# Patient Record
Sex: MALE | Race: WHITE | NOT HISPANIC OR LATINO | Employment: OTHER | URBAN - METROPOLITAN AREA
[De-identification: names, ages, dates, MRNs, and addresses within clinical notes are randomized per-mention and may not be internally consistent; named-entity substitution may affect disease eponyms.]

---

## 2017-02-14 ENCOUNTER — CLINICAL SUPPORT (OUTPATIENT)
Dept: ORTHOPEDIC SURGERY | Facility: CLINIC | Age: 77
End: 2017-02-14

## 2017-02-14 VITALS — BODY MASS INDEX: 29.82 KG/M2 | TEMPERATURE: 97.6 F | HEIGHT: 67 IN | WEIGHT: 190 LBS

## 2017-02-14 DIAGNOSIS — M17.11 ARTHRITIS OF RIGHT KNEE: Primary | ICD-10-CM

## 2017-02-14 PROCEDURE — 99212 OFFICE O/P EST SF 10 MIN: CPT | Performed by: ORTHOPAEDIC SURGERY

## 2017-02-14 RX ORDER — BACLOFEN 10 MG/1
10 TABLET ORAL 3 TIMES DAILY
Qty: 90 TABLET | Refills: 1 | Status: SHIPPED | OUTPATIENT
Start: 2017-02-14 | End: 2017-03-02

## 2017-02-14 NOTE — PATIENT INSTRUCTIONS
Generic Knee Exercises  EXERCISES  RANGE OF MOTION (ROM) AND STRETCHING EXERCISES  These exercises may help you when beginning to rehabilitate your injury. Your symptoms may resolve with or without further involvement from your physician, physical therapist, or . While completing these exercises, remember:   · Restoring tissue flexibility helps normal motion to return to the joints. This allows healthier, less painful movement and activity.  · An effective stretch should be held for at least 30 seconds.  · A stretch should never be painful. You should only feel a gentle lengthening or release in the stretched tissue.  STRETCH - Knee Extension, Prone  · Lie on your stomach on a firm surface, such as a bed or countertop. Place your right / left knee and leg just beyond the edge of the surface. You may wish to place a towel under the far end of your right / left thigh for comfort.  · Relax your leg muscles and allow gravity to straighten your knee. Your clinician may advise you to add an ankle weight if more resistance is helpful for you.  · You should feel a stretch in the back of your right / left knee. Hold this position for __________ seconds.  Repeat __________ times. Complete this stretch __________ times per day.  * Your physician, physical therapist, or  may ask you to add ankle weight to enhance your stretch.   RANGE OF MOTION - Knee Flexion, Active  · Lie on your back with both knees straight. (If this causes back discomfort, bend your opposite knee, placing your foot flat on the floor.)  · Slowly slide your heel back toward your buttocks until you feel a gentle stretch in the front of your knee or thigh.  · Hold for __________ seconds. Slowly slide your heel back to the starting position.  Repeat __________ times. Complete this exercise __________ times per day.   STRETCH - Quadriceps, Prone   · Lie on your stomach on a firm surface, such as a bed or padded floor.  · Bend your  right / left knee and grasp your ankle. If you are unable to reach your ankle or pant leg, use a belt around your foot to lengthen your reach.  · Gently pull your heel toward your buttocks. Your knee should not slide out to the side. You should feel a stretch in the front of your thigh and/or knee.  · Hold this position for __________ seconds.  Repeat __________ times. Complete this stretch __________ times per day.   STRETCH - Hamstrings, Supine   · Lie on your back. Loop a belt or towel over the ball of your right / left foot.  · Straighten your right / left knee and slowly pull on the belt to raise your leg. Do not allow the right / left knee to bend. Keep your opposite leg flat on the floor.  · Raise the leg until you feel a gentle stretch behind your right / left knee or thigh. Hold this position for __________ seconds.  Repeat __________ times. Complete this stretch __________ times per day.   STRENGTHENING EXERCISES  These exercises may help you when beginning to rehabilitate your injury. They may resolve your symptoms with or without further involvement from your physician, physical therapist, or . While completing these exercises, remember:   · Muscles can gain both the endurance and the strength needed for everyday activities through controlled exercises.  · Complete these exercises as instructed by your physician, physical therapist, or . Progress the resistance and repetitions only as guided.  · You may experience muscle soreness or fatigue, but the pain or discomfort you are trying to eliminate should never worsen during these exercises. If this pain does worsen, stop and make certain you are following the directions exactly. If the pain is still present after adjustments, discontinue the exercise until you can discuss the trouble with your clinician.  STRENGTH - Quadriceps, Isometrics  · Lie on your back with your right / left leg extended and your opposite knee  "bent.  · Gradually tense the muscles in the front of your right / left thigh. You should see either your knee cap slide up toward your hip or increased dimpling just above the knee. This motion will push the back of the knee down toward the floor/mat/bed on which you are lying.  · Hold the muscle as tight as you can without increasing your pain for __________ seconds.  · Relax the muscles slowly and completely in between each repetition.  Repeat __________ times. Complete this exercise __________ times per day.   STRENGTH - Quadriceps, Short Arcs   · Lie on your back. Place a __________ inch towel roll under your knee so that the knee slightly bends.  · Raise only your lower leg by tightening the muscles in the front of your thigh. Do not allow your thigh to rise.  · Hold this position for __________ seconds.  Repeat __________ times. Complete this exercise __________ times per day.   OPTIONAL ANKLE WEIGHTS: Begin with ____________________, but DO NOT exceed ____________________. Increase in 1 pound/0.5 kilogram increments.   STRENGTH - Quadriceps, Straight Leg Raises   Quality counts! Watch for signs that the quadriceps muscle is working to insure you are strengthening the correct muscles and not \"cheating\" by substituting with healthier muscles.  · Lay on your back with your right / left leg extended and your opposite knee bent.  · Tense the muscles in the front of your right / left thigh. You should see either your knee cap slide up or increased dimpling just above the knee. Your thigh may even quiver.  · Tighten these muscles even more and raise your leg 4 to 6 inches off the floor. Hold for __________ seconds.  · Keeping these muscles tense, lower your leg.  · Relax the muscles slowly and completely in between each repetition.  Repeat __________ times. Complete this exercise __________ times per day.   STRENGTH - Hamstring, Curls  · Lay on your stomach with your legs extended. (If you lay on a bed, your feet " may hang over the edge.)  · Tighten the muscles in the back of your thigh to bend your right / left knee up to 90 degrees. Keep your hips flat on the bed/floor.  · Hold this position for __________ seconds.  · Slowly lower your leg back to the starting position.  Repeat __________ times. Complete this exercise __________ times per day.   OPTIONAL ANKLE WEIGHTS: Begin with ____________________, but DO NOT exceed ____________________. Increase in 1 pound/0.5 kilogram increments.   STRENGTH - Quadriceps, Squats  ·  a door frame so that your feet and knees are in line with the frame.  · Use your hands for balance, not support, on the frame.  · Slowly lower your weight, bending at the hips and knees. Keep your lower legs upright so that they are parallel with the door frame. Squat only within the range that does not increase your knee pain. Never let your hips drop below your knees.  · Slowly return upright, pushing with your legs, not pulling with your hands.  Repeat __________ times. Complete this exercise __________ times per day.   STRENGTH - Quadriceps, Wall Slides   Follow guidelines for form closely. Increased knee pain often results from poorly placed feet or knees.  · Lean against a smooth wall or door and walk your feet out 18-24 inches. Place your feet hip-width apart.  · Slowly slide down the wall or door until your knees bend __________ degrees.* Keep your knees over your heels, not your toes, and in line with your hips, not falling to either side.  · Hold for __________ seconds. Stand up to rest for __________ seconds in between each repetition.  Repeat __________ times. Complete this exercise __________ times per day.  * Your physician, physical therapist, or  will alter this angle based on your symptoms and progress.     This information is not intended to replace advice given to you by your health care provider. Make sure you discuss any questions you have with your health care  provider.     Document Released: 11/01/2006 Document Revised: 01/08/2016 Document Reviewed: 04/01/2010  [employer] Interactive Patient Education ©2016 [employer]

## 2017-03-02 ENCOUNTER — APPOINTMENT (OUTPATIENT)
Dept: PREADMISSION TESTING | Facility: HOSPITAL | Age: 77
End: 2017-03-02

## 2017-03-02 VITALS
WEIGHT: 185.3 LBS | RESPIRATION RATE: 16 BRPM | TEMPERATURE: 96.5 F | HEIGHT: 69 IN | HEART RATE: 77 BPM | SYSTOLIC BLOOD PRESSURE: 179 MMHG | DIASTOLIC BLOOD PRESSURE: 98 MMHG | OXYGEN SATURATION: 96 % | BODY MASS INDEX: 27.44 KG/M2

## 2017-03-02 DIAGNOSIS — G89.29 CHRONIC PAIN OF RIGHT KNEE: Primary | ICD-10-CM

## 2017-03-02 DIAGNOSIS — M25.561 CHRONIC PAIN OF RIGHT KNEE: Primary | ICD-10-CM

## 2017-03-02 LAB
ABO GROUP BLD: NORMAL
BILIRUB UR QL STRIP: NEGATIVE
BLD GP AB SCN SERPL QL: NEGATIVE
CLARITY UR: CLEAR
COLOR UR: ABNORMAL
GLUCOSE UR STRIP-MCNC: NEGATIVE MG/DL
HGB UR QL STRIP.AUTO: NEGATIVE
KETONES UR QL STRIP: NEGATIVE
LEUKOCYTE ESTERASE UR QL STRIP.AUTO: NEGATIVE
NITRITE UR QL STRIP: NEGATIVE
PH UR STRIP.AUTO: <=5 [PH] (ref 5–8)
PROT UR QL STRIP: NEGATIVE
RH BLD: POSITIVE
SP GR UR STRIP: 1.03 (ref 1–1.03)
UROBILINOGEN UR QL STRIP: ABNORMAL

## 2017-03-02 PROCEDURE — G8980 MOBILITY D/C STATUS: HCPCS

## 2017-03-02 PROCEDURE — G8979 MOBILITY GOAL STATUS: HCPCS

## 2017-03-02 PROCEDURE — 93005 ELECTROCARDIOGRAM TRACING: CPT

## 2017-03-02 PROCEDURE — 97161 PT EVAL LOW COMPLEX 20 MIN: CPT

## 2017-03-02 PROCEDURE — G8978 MOBILITY CURRENT STATUS: HCPCS

## 2017-03-02 PROCEDURE — 81003 URINALYSIS AUTO W/O SCOPE: CPT | Performed by: ORTHOPAEDIC SURGERY

## 2017-03-02 PROCEDURE — 86850 RBC ANTIBODY SCREEN: CPT

## 2017-03-02 PROCEDURE — 86901 BLOOD TYPING SEROLOGIC RH(D): CPT

## 2017-03-02 PROCEDURE — 36415 COLL VENOUS BLD VENIPUNCTURE: CPT

## 2017-03-02 PROCEDURE — 86900 BLOOD TYPING SEROLOGIC ABO: CPT

## 2017-03-02 NOTE — PROGRESS NOTES
Physical Therapy Outpatient Preoperative Total Joint Evaluation     Patient Name: Sundeep Mason  : 1940  MRN: [id number]  Today's Date: 3/2/2017         Surgery Date: 03/15/17    Patient Active Problem List   Diagnosis   • S/P total knee replacement using cement   • Arthritis of right knee   • Chronic pain of right knee        Past Medical History   Diagnosis Date   • Anxiety and depression      RECENT LOSS OF HIS WIFE; PT STATED THAT HE FELT LIKE DYING EVERYDAY BUT ISN'T SUICIDAL; ENCOURAGED HIM TO TALK TO PCP ABOUT GETTING HELP.   • Arthritis    • Asthma    • Chronic chest pain    • DDD (degenerative disc disease), cervical    • GERD (gastroesophageal reflux disease)    • Headache    • Hiatal hernia    • History of kidney stones    • Hx of staphylococcal infection      AFTER LEFT TKA   • Hyperlipidemia    • Hypertension    • Lung nodules    • Neck and shoulder pain    • Numbness or tingling    • Open wound of arm      RIGHT ARM; HEALING   • PVD (peripheral vascular disease)         Past Surgical History   Procedure Laterality Date   • Rotator cuff repair Bilateral    • Cervical discectomy     • Total knee arthroplasty Left    • Tonsillectomy                TOTAL JOINT PREOP EVAL (last 72 hours)      Total Joint Preop Evaluation       [id number]                Preoperative Evaluation    Surgery TKR: Right  -TV        Surgery Date 03/15/17  -TV        Previous Total Joint Yes  -TV        What type: l tkr 11 years ago  -TV        Attended Class yes  -TV        Prior Activity Status    All Household independent  -TV        All Community independent  -TV        Gait independent  -TV        Transfers independent  -TV         none  -TV        DC Plans Home;Sub Acute   unsure  -TV        Assist at home none  -TV        Home Environment 1 story;basement;lives first floor  -TV        Exterior steps 1 rail   3  -TV        Interior steps 1 rail   14  -TV        Pain 0-10    Pain Level 7  -TV        Location  r knee  -TV        LE Measurements - ROM    Hip Flexion - Right AROM is WNL;Strength is grossly > or equal to 3/5  -TV        Hip Abduction - Right AROM is WNL;Strength is grossly > or equal to 3/5  -TV        Knee Flexion - Right Strength is grossly > or equal to 3/5   110  -TV        Knee Extension - Right Strength is grossly > or equal to 3/5   -5  -TV        Hip Flexion - Left AROM is WNL;Strength is grossly > or equal to 3/5  -TV        Hip Abduction - Left AROM is WNL;Strength is grossly > or equal to 3/5  -TV        Knee Flexion - Left Strength is grossly > or equal to 3/5   90  -TV        Knee Extension - Left Strength is grossly > or equal to 3/5   0  -TV        UE ROM/Strength    UE ROM/Strength adequate for walker use  -TV        Other Measurements    Sensation/Circulation intact  -TV        Gait Observation no device  -TV        Equipment    Equipment Patient Has Walker;Cane;Bedside commode  -TV        ASSESSMENT    Goal Patient demonstrates good understanding of post-op P.T.;Exercises;Surgical Procedure  -TV        Goal Met? Yes  -TV        Patient agrees with Plan of Treatment? Yes  -TV        Plan Will see for post op TJR protocol  -TV          User Key  (r) = Recorded By, (t) = Taken By, (c) = Cosigned By    Initials Name Effective Dates    TV Lu Garcia PT 01/07/16 -              Time Calculation:          Therapy Charges for Today     Code Description Service Date Service Provider Modifiers Qty    10217432035 HC PT MOBILITY CURRENT 3/2/2017 Lu Garcia, PT GP, CI 1    27353466205 HC PT MOBILITY PROJECTED 3/2/2017 Lu Garcia, PT GP, CI 1    08760073257 HC PT MOBILITY DISCHARGE 3/2/2017 Lu Garcia, PT GP, CI 1    94643771795 HC PAT-TOTAL JOINT CLASS 3/2/2017 Lu Garcia PT  1    [id number] HC PT EVAL LOW COMPLEXITY 1 3/2/2017 Lu Garcia, PT GP 1            PT G-Codes  PT Professional Judgement Used?: Yes  Functional Limitation: Mobility: Walking and moving around  Mobility: Walking and Moving  Around Current Status (): At least 1 percent but less than 20 percent impaired, limited or restricted  Mobility: Walking and Moving Around Goal Status (): At least 1 percent but less than 20 percent impaired, limited or restricted  Mobility: Walking and Moving Around Discharge Status (): At least 1 percent but less than 20 percent impaired, limited or restricted      Lu Garcia, PT  3/2/2017

## 2017-03-02 NOTE — DISCHARGE INSTRUCTIONS
2% CHLORAHEXIDINE GLUCONATE* CLOTH  Preparing or “prepping” skin before surgery can reduce the risk of infection at the surgical site. To make the process easier, [facility] has chosen disposable cloths moistened with a rinse-free, 2% Chlorhexidine Gluconate (CHG) antiseptic solution. The steps below outline the prepping process and should be carefully followed.        Use the prep cloth on the area that is circled in the diagram             Directions Night before Surgery  1) Shower using a fresh bar of anti-bacterial soap (such as Dial) and clean washcloth.  Use a clean towel to completely dry your skin.  2) Do not use any lotions, oils or creams on your skin.  3) Open the package and remove 1 cloth, wipe your skin for 30 seconds in a circular motion.  Allow to dry for 3 minutes.  4) Repeat #3 with second cloth.  5) Do not touch your eyes, ears, or mouth with the prep cloth.  6) Allow the wet prep solution to air dry.  7) Discard the prep cloth and wash your hands with soap and water.   8) Dress in clean bed clothes and sleep on fresh clean bed sheets.   9) You may experience some temporary itching after the prep.    Directions Day of Surgery  1) Repeat steps 1,2,3,4,5,6,7, and 9.   2) Dress in clean clothes before coming to the hospital.    BACTROBAN NASAL OINTMENT  There are many germs normally in your nose. Bactroban is an ointment that will help reduce these germs. Please follow these instructions for Bactroban use:        ____The day before surgery in the morning  Date__3/14______    ____The day before surgery in the evening              Date__3/14______    ____The day of surgery in the morning    Date__3/15____    **Squirt ½ package of Bactroban Ointment onto a cotton applicator and apply to inside of 1st nostril.  Squirt the remaining Bactroban and apply to the inside of the other nostril.    Take the following medications the morning of surgery with a small sip of water.  B/P MED,  PEPCID    ARRIVAL TIME 07:00        General Instructions:  • Do not eat or drink after midnight: includes water, mints, or gum. You may brush your teeth.  • Do not smoke, chew tobacco, or drink alcohol.  • The Pre-Admission Testing nurse will instruct you to bring medications if unable to obtain an accurate list in Pre-Admission Testing.    • If applicable bring your C-PAP/ BI-PAP machine.  • Bring any papers given to you in the doctor’s office.  • Wear clean comfortable clothes and socks.  • Do not wear contact lenses or make-up.  Bring a case for your glasses if applicable.   • Bring crutches or walker if applicable.  • Leave all other valuables and jewelry at home.    If you were given a blood bank ID arm band remember to bring it with you the day of surgery.    Preventing a Surgical Site Infection:  Shower on the morning of surgery using a fresh bar of anti-bacterial soap (such as Dial) and clean washcloth.  Dry with a clean towel and dress in clean clothing.  For 2 to 3 days before surgery, avoid shaving with a razor near where you will have surgery because the razor can irritate skin and make it easier to develop an infection  Ask your surgeon if you will be receiving antibiotics prior to surgery  Make sure you, your family, and all healthcare providers clean their hands with soap and water or an alcohol based hand  before caring for you or your wound  If at all possible, quit smoking as many days before surgery as you can.    Day of surgery:  Upon arrival, a Pre-op nurse and Anesthesiologist will review your health history, obtain vital signs, and answer questions you may have.  The only belongings needed at this time will be your home medications and if applicable your C-PAP/BI-PAP machine.  If you are staying overnight your family can leave the rest of your belongings in the car and bring them to your room later.  A Pre-op nurse will start an IV and you may receive medication in preparation for  surgery, including something to help you relax.  Your family will be able to see you in the Pre-op area.  While you are in surgery your family should notify the waiting room  if they leave the waiting room area and provide a contact phone number.    Please be aware that surgery does come with discomfort.  We want to make every effort to control your discomfort so please discuss any uncontrolled symptoms with your nurse.   Your doctor will most likely have prescribed pain medications.      If you are going home after surgery you will receive individualized written care instructions before being discharged.  A responsible adult must drive you to and from the hospital on the day of your surgery and stay with you for 24 hours.    If you are staying overnight following surgery, you will be transported to your hospital room following the recovery period.  [facility] has all private rooms.    If you have any questions please call Pre-Admission Testing at [phone].  Deductibles and co-payments are collected on the day of service. Please be prepared to pay the required co-pay, deductible or deposit on the day of service as defined by your plan.

## 2017-03-03 ENCOUNTER — OFFICE VISIT (OUTPATIENT)
Dept: ORTHOPEDIC SURGERY | Facility: CLINIC | Age: 77
End: 2017-03-03

## 2017-03-03 VITALS — WEIGHT: 186 LBS | TEMPERATURE: 97.6 F | BODY MASS INDEX: 28.19 KG/M2 | HEIGHT: 68 IN

## 2017-03-03 DIAGNOSIS — M17.11 PRIMARY OSTEOARTHRITIS OF RIGHT KNEE: Primary | ICD-10-CM

## 2017-03-03 PROCEDURE — S0260 H&P FOR SURGERY: HCPCS | Performed by: NURSE PRACTITIONER

## 2017-03-03 PROCEDURE — 73560 X-RAY EXAM OF KNEE 1 OR 2: CPT | Performed by: NURSE PRACTITIONER

## 2017-03-03 PROCEDURE — 73565 X-RAY EXAM OF KNEES: CPT | Performed by: NURSE PRACTITIONER

## 2017-03-03 RX ORDER — LOSARTAN POTASSIUM 50 MG/1
50 TABLET ORAL DAILY
COMMUNITY
End: 2019-05-02

## 2017-03-03 NOTE — PROGRESS NOTES
History & Physical       Patient: Sundeep Mason  YOB: 1940  Medical Record Number: [id number]  Body mass index is 28.28 kg/(m^2).    Surgeon:  Dr. Lg Jaramillo    Chief Complaints:   Chief Complaint   Patient presents with   • Right Knee - Follow-up       Subjective:  This problem is not new to this examiner.     History of Present Illness: 76 y.o. male presents with   Chief Complaint   Patient presents with   • Right Knee - Follow-up   . Onset of symptoms was years ago and has been progressively worsening despite more conservative treatment measures.  Symptoms are associated with ability to move, exercise, and perform activities of daily living.  Symptoms are aggravated by weight bearing and ROM necessary for activities of daily living.   Symptoms improve with rest, ice and elevation only minimally.      Allergies: No Known Allergies    Medications:   Home Medications:  Current Outpatient Prescriptions on File Prior to Visit   Medication Sig   • aspirin 81 MG tablet Take 81 mg by mouth Daily. PT TO STOP PER MD INSTRUCTION   • atorvastatin (LIPITOR) 20 MG tablet Take 20 mg by mouth daily.   • Chlorhexidine Gluconate (HIBICLENS EX) Apply  topically. AS DIRECTED PREOP   • famotidine (PEPCID) 20 MG tablet Take 20 mg by mouth 2 (two) times a day.   • mupirocin (BACTROBAN) 2 % ointment Apply  topically 3 (Three) Times a Day. AS DIRECTED PREOP   • Naproxen Sodium (ALEVE PO) Take 1 tablet by mouth Daily. PT TO STOP PER MD INSTRUCTION     Current Facility-Administered Medications on File Prior to Visit   Medication   • mupirocin (BACTROBAN) 2 % nasal ointment     Current Medications:  Scheduled Meds:  Continuous Infusions:  No current facility-administered medications for this visit.   PRN Meds:.    I have reviewed the patient's medical history in detail and updated the computerized patient record.  Review and summarization of old records include:    Past Medical History   Diagnosis Date   • Anxiety and  depression      RECENT LOSS OF HIS WIFE; PT STATED THAT HE FELT LIKE DYING EVERYDAY BUT ISN'T SUICIDAL; ENCOURAGED HIM TO TALK TO PCP ABOUT GETTING HELP.   • Arthritis    • Asthma    • Chronic chest pain    • DDD (degenerative disc disease), cervical    • GERD (gastroesophageal reflux disease)    • Headache    • Hiatal hernia    • History of kidney stones    • Hx of staphylococcal infection      AFTER LEFT TKA   • Hyperlipidemia    • Hypertension    • Lung nodules    • Neck and shoulder pain    • Numbness or tingling    • Open wound of arm      RIGHT ARM; HEALING   • PVD (peripheral vascular disease)         Past Surgical History   Procedure Laterality Date   • Rotator cuff repair Bilateral    • Cervical discectomy     • Total knee arthroplasty Left    • Tonsillectomy          Social History     Occupational History   • Not on file.     Social History Main Topics   • Smoking status: Former Smoker     Quit date: 7/1/2001   • Smokeless tobacco: Never Used   • Alcohol use Yes      Comment: RARE USE   • Drug use: No   • Sexual activity: Not on file    Social History     Social History Narrative        Family History   Problem Relation Age of Onset   • Hypertension Other    • Heart disease Other    • Diabetes Other        ROS: 14 point review of systems was performed and was negative except for documented findings in HPI and today's encounter.     Allergies: No Known Allergies  Constitutional:  Denies fever, shaking or chills   Eyes:  Denies change in visual acuity   HENT:  Denies nasal congestion or sore throat   Respiratory:  Denies cough or shortness of breath   Cardiovascular:  Denies chest pain or severe LE edema   GI:  Denies abdominal pain, nausea, vomiting, bloody stools or diarrhea   Musculoskeletal:  Denies numbness tingling or loss of motor function except as outlined above in history of present illness.  : Denies painful urination or hematuria  Integument:  Denies rash, lesion or ulceration   Neurologic:   Denies headache or focal weakness  Endocrine:  Denies lymphadenopathy  Psych:  Denies confusion or change in mental status   Hem:  Denies active bleeding    Physical Exam: 76 y.o. male  Body mass index is 28.28 kg/(m^2)., @HT@, @WT@  Vitals:    03/03/17 0842   Temp: 97.6 °F (36.4 °C)     Vital signs reviewed.   General Appearance:    Alert, cooperative, in no acute distress                  Eyes: conjunctiva clear  ENT: external ears and nose atraumatic  CV: no peripheral edema  Resp: normal respiratory effort  Skin: no rashes or wounds; normal turgor  Psych: mood and affect appropriate  Lymph: no nodes appreciated  Neuro: gross sensation intact  Vascular:  Palpable peripheral pulse in noted extremity  Musculoskeletal Extremities: DETAILED KNEE Exam: Right knee: Painful gait w/wo limp, muscle atrophy, erythema, ecchymosis, or gross deformity noted, Large knee effusion, + medial joint line tenderness, Active range of motion flexion contracture, 5/5 strength flexion and extension, The knee is stable to varus and valgus stress testing, VARUS VALGUS NEUTRAL: varus alignment of the limb, Lachman negative, Posterior drawer negative, Diamante's negative, Patellofemoral grind +, Sensation grossly intact to light tough throughout the lower extremity, Skin is intact, Distal pulses are palpable, No signs or symptoms of DVT          Diagnostic Tests: Labs done at [facility] on 2/21/17  GFR>60 Na 147 BMP otherwise WLN and , TSH 1.9, H/H 14.8,44.7, plt 142, wbc 7.7  Appointment on 03/02/2017   Component Date Value Ref Range Status   • ABO Type 03/02/2017 O   Final   • RH type 03/02/2017 Positive   Final   • Antibody Screen 03/02/2017 Negative   Final   • Color,  03/02/2017 Dark Yellow* Yellow, Straw Final   • Appearance,  03/02/2017 Clear  Clear Final   • pH,  03/02/2017 <=5.0  5.0 - 8.0 Final   • Specific Gravity,  03/02/2017 1.027  1.005 - 1.030 Final   • Glucose,  03/02/2017 Negative  Negative Final   • Ketones,  UA 03/02/2017 Negative  Negative Final   • Bilirubin, UA 03/02/2017 Negative  Negative Final   • Blood, UA 03/02/2017 Negative  Negative Final   • Protein, UA 03/02/2017 Negative  Negative Final   • Leuk Esterase, UA 03/02/2017 Negative  Negative Final   • Nitrite, UA 03/02/2017 Negative  Negative Final   • Urobilinogen, UA 03/02/2017 0.2 E.U./dL  0.2 - 1.0 E.U./dL Final     No results found.    Imaging was done today and discussed at length with the patient:    Indication: pain related symptoms,  Views: 3V AP, LAT & 40 degree PA right knee(s)   Findings: severe end-stage arthritis (bone on bone, subchondral sclerosis/cysts, osteophytes), S/P left  Total Knee Replacement in good position and alignment  Comparison views: viewed last xray done in the office.     Assessment:  Patient Active Problem List   Diagnosis   • S/P total knee replacement using cement   • Arthritis of right knee   • Chronic pain of right knee   • Primary osteoarthritis of right knee       Plan:  Dr. Lg Jaramillo reviewed anatomy of a total joint arthroplasty in laymen's terms, as well as typical postoperative recovery and possibly 6-12 months for maximal recovery, and possible need for rehabilitation stay after hospitalization. We also discussed risks, benefits, alternatives, and limitations of procedure with risks including but not limited to neurovascular damage, bleeding, infection, malalignment, chronic pian, failure of implants, osteolysis, loosening of implants, loss of motion, weakness, stiffness, instability, DVT, pulmonary embolus, death, stroke, complex regional pain syndrome, myocardial infarction, and need for additional procedures. Concept of substitution vs. replacement discussed.  No guarantees were given regarding results of surgery.      Sundeep Mason was given the opportunity to ask and have all questions answered today.  The patient voiced understanding of the risks, benefits, and alternative forms of treatment that were  discussed and the patient consents to proceed with surgery.     Patient's blood clot history is negative.    Discharge Plan: POD 2-3 to be determined (has to see if daughter). , Hx mrsa infection following L TKA >10years ago. Has RLL lung nodule that has changed from 1 to 1.9cm and is to see Dr. Wynne at [facility] to have biopsy. Will ask about surgery at that time.     Patient was seen by JULIETTE Cedillo in the office today.    Date: 3/3/2017  JULIETTE Whittington

## 2017-03-03 NOTE — PATIENT INSTRUCTIONS
Total Knee Replacement  Total knee replacement is a procedure to replace your knee joint with an artificial knee joint (prosthetic knee joint). The purpose of this surgery is to reduce pain and improve your knee function.  LET YOUR HEALTH CARE PROVIDER KNOW ABOUT:   · Any allergies you have.  · All medicines you are taking, including vitamins, herbs, eye drops, creams, and over-the-counter medicines.  · Previous problems you or members of your family have had with the use of anesthetics.  · Any blood disorders you have.  · Previous surgeries you have had.  · Medical conditions you have.  RISKS AND COMPLICATIONS   Generally, total knee replacement is a safe procedure. However, problems can occur, including:  · Loss of range of motion of the knee or instability of the knee.  · Loosening of the prosthesis.  · Infection.  · Persistent pain.  BEFORE THE PROCEDURE   · Plan to have someone take you home after the procedure.  · Do not eat or drink anything after midnight on the night before the procedure or as directed by your health care provider.  · Ask your health care provider about:  ¨ Changing or stopping your regular medicines. This is especially important if you are taking diabetes medicines or blood thinners.  ¨ Taking medicines such as aspirin and ibuprofen. These medicines can thin your blood. Do not take these medicines before your procedure if your health care provider asks you not to.  ¨ Ask your health care provider about how your surgical site will be marked or identified.  · You may be given antibiotic medicines to help prevent infection.  PROCEDURE   · To reduce your risk of infection:    Your health care team will wash or sanitize their hands.    Your skin will be washed with soap.  · An IV tube will be inserted into one of your veins. You will be given one or more of the following:    A medicine that makes you drowsy (sedative).    A medicine that makes you fall asleep (general anesthetic).    A medicine  injected into your spine that numbs your body below the waist (spinal anesthetic).    A medicine to block feeling in your leg (nerve block) to help ease pain after surgery.  · An incision will be made in your knee. Your surgeon will take out any damaged cartilage and bone by sawing off the damaged surfaces.  · The surgeon will then put a new metal liner over the sawed-off portion of your thigh bone (femur) and a plastic liner over the sawed-off portion of one of the bones of your lower leg (tibia). This is to restore alignment and function to your knee. A plastic piece is often used to restore the surface of your knee cap.  AFTER THE PROCEDURE   · You will stay in a recovery area until your medicines wear off.  · You may have drainage tubes to drain excess fluid from your knee. These tubes attach to a device that removes these fluids.  · Once you are awake, stable, and taking fluids well, you will be taken to your hospital room.  · You may be directed to take actions to help prevent blood clots. These may include:    Walking shortly after surgery, with someone assisting you. Moving around after surgery helps to improve blood flow.    Wearing compression stockings or using different types of devices.  · You will receive physical therapy as prescribed by your health care provider.     This information is not intended to replace advice given to you by your health care provider. Make sure you discuss any questions you have with your health care provider.     Document Released: 03/26/2002 Document Revised: 09/07/2016 Document Reviewed: 01/27/2013  [employer] Interactive Patient Education ©2016 [employer]

## 2017-03-15 ENCOUNTER — APPOINTMENT (OUTPATIENT)
Dept: GENERAL RADIOLOGY | Facility: HOSPITAL | Age: 77
End: 2017-03-15

## 2017-03-15 ENCOUNTER — ANESTHESIA EVENT (OUTPATIENT)
Dept: PERIOP | Facility: HOSPITAL | Age: 77
End: 2017-03-15

## 2017-03-15 ENCOUNTER — ANESTHESIA (OUTPATIENT)
Dept: PERIOP | Facility: HOSPITAL | Age: 77
End: 2017-03-15

## 2017-03-15 PROCEDURE — 25010000003 CEFAZOLIN IN DEXTROSE 2-4 GM/100ML-% SOLUTION: Performed by: ORTHOPAEDIC SURGERY

## 2017-03-15 PROCEDURE — 25010000002 FENTANYL CITRATE (PF) 100 MCG/2ML SOLUTION: Performed by: NURSE ANESTHETIST, CERTIFIED REGISTERED

## 2017-03-15 PROCEDURE — 25010000002 ONDANSETRON PER 1 MG: Performed by: NURSE ANESTHETIST, CERTIFIED REGISTERED

## 2017-03-15 PROCEDURE — 25010000002 PROPOFOL 10 MG/ML EMULSION: Performed by: NURSE ANESTHETIST, CERTIFIED REGISTERED

## 2017-03-15 PROCEDURE — 73560 X-RAY EXAM OF KNEE 1 OR 2: CPT

## 2017-03-15 PROCEDURE — 25010000002 DEXAMETHASONE PER 1 MG: Performed by: NURSE ANESTHETIST, CERTIFIED REGISTERED

## 2017-03-15 RX ORDER — FENTANYL CITRATE 50 UG/ML
INJECTION, SOLUTION INTRAMUSCULAR; INTRAVENOUS AS NEEDED
Status: DISCONTINUED | OUTPATIENT
Start: 2017-03-15 | End: 2017-03-15 | Stop reason: SURG

## 2017-03-15 RX ORDER — LIDOCAINE HYDROCHLORIDE 20 MG/ML
INJECTION, SOLUTION INFILTRATION; PERINEURAL AS NEEDED
Status: DISCONTINUED | OUTPATIENT
Start: 2017-03-15 | End: 2017-03-15 | Stop reason: SURG

## 2017-03-15 RX ORDER — DEXAMETHASONE SODIUM PHOSPHATE 10 MG/ML
INJECTION INTRAMUSCULAR; INTRAVENOUS AS NEEDED
Status: DISCONTINUED | OUTPATIENT
Start: 2017-03-15 | End: 2017-03-15 | Stop reason: SURG

## 2017-03-15 RX ORDER — LABETALOL HYDROCHLORIDE 5 MG/ML
INJECTION, SOLUTION INTRAVENOUS AS NEEDED
Status: DISCONTINUED | OUTPATIENT
Start: 2017-03-15 | End: 2017-03-15 | Stop reason: SURG

## 2017-03-15 RX ORDER — ONDANSETRON 2 MG/ML
INJECTION INTRAMUSCULAR; INTRAVENOUS AS NEEDED
Status: DISCONTINUED | OUTPATIENT
Start: 2017-03-15 | End: 2017-03-15 | Stop reason: SURG

## 2017-03-15 RX ORDER — GLYCOPYRROLATE 0.2 MG/ML
INJECTION INTRAMUSCULAR; INTRAVENOUS AS NEEDED
Status: DISCONTINUED | OUTPATIENT
Start: 2017-03-15 | End: 2017-03-15 | Stop reason: SURG

## 2017-03-15 RX ORDER — PROPOFOL 10 MG/ML
VIAL (ML) INTRAVENOUS AS NEEDED
Status: DISCONTINUED | OUTPATIENT
Start: 2017-03-15 | End: 2017-03-15 | Stop reason: SURG

## 2017-03-15 RX ADMIN — DEXAMETHASONE SODIUM PHOSPHATE 8 MG: 10 INJECTION INTRAMUSCULAR; INTRAVENOUS at 09:40

## 2017-03-15 RX ADMIN — FENTANYL CITRATE 100 MCG: 50 INJECTION, SOLUTION INTRAMUSCULAR; INTRAVENOUS at 09:14

## 2017-03-15 RX ADMIN — LABETALOL HYDROCHLORIDE 5 MG: 5 INJECTION, SOLUTION INTRAVENOUS at 10:07

## 2017-03-15 RX ADMIN — GLYCOPYRROLATE 0.2 MG: 0.2 INJECTION INTRAMUSCULAR; INTRAVENOUS at 09:39

## 2017-03-15 RX ADMIN — EPHEDRINE SULFATE 10 MG: 50 INJECTION INTRAMUSCULAR; INTRAVENOUS; SUBCUTANEOUS at 09:26

## 2017-03-15 RX ADMIN — PROPOFOL 160 MG: 10 INJECTION, EMULSION INTRAVENOUS at 09:14

## 2017-03-15 RX ADMIN — LIDOCAINE HYDROCHLORIDE 60 MG: 20 INJECTION, SOLUTION INFILTRATION; PERINEURAL at 09:14

## 2017-03-15 RX ADMIN — FENTANYL CITRATE 25 MCG: 50 INJECTION, SOLUTION INTRAMUSCULAR; INTRAVENOUS at 09:25

## 2017-03-15 RX ADMIN — EPHEDRINE SULFATE 10 MG: 50 INJECTION INTRAMUSCULAR; INTRAVENOUS; SUBCUTANEOUS at 09:36

## 2017-03-15 RX ADMIN — FENTANYL CITRATE 25 MCG: 50 INJECTION, SOLUTION INTRAMUSCULAR; INTRAVENOUS at 09:30

## 2017-03-15 RX ADMIN — FENTANYL CITRATE 50 MCG: 50 INJECTION, SOLUTION INTRAMUSCULAR; INTRAVENOUS at 09:56

## 2017-03-15 RX ADMIN — CEFAZOLIN SODIUM 2 G: 2 INJECTION, SOLUTION INTRAVENOUS at 09:16

## 2017-03-15 RX ADMIN — SODIUM CHLORIDE, POTASSIUM CHLORIDE, SODIUM LACTATE AND CALCIUM CHLORIDE: 600; 310; 30; 20 INJECTION, SOLUTION INTRAVENOUS at 09:50

## 2017-03-15 RX ADMIN — ONDANSETRON 4 MG: 2 INJECTION INTRAMUSCULAR; INTRAVENOUS at 10:40

## 2017-03-15 RX ADMIN — FENTANYL CITRATE 50 MCG: 50 INJECTION, SOLUTION INTRAMUSCULAR; INTRAVENOUS at 09:46

## 2017-03-15 NOTE — ANESTHESIA PROCEDURE NOTES
Airway  Urgency: elective    Date/Time: 3/15/2017 9:15 AM  Airway not difficult    General Information and Staff    Patient location during procedure: OR  Anesthesiologist: FRANCO ROMERO  CRNA: [employer]    Indications and Patient Condition  Indications for airway management: airway protection    Preoxygenated: yes  MILS not maintained throughout  Mask difficulty assessment: 0 - not attempted    Final Airway Details  Final airway type: supraglottic airway      Successful airway: classic  Size 5    Number of attempts at approach: 1

## 2017-03-15 NOTE — ANESTHESIA PREPROCEDURE EVALUATION
Anesthesia Evaluation     Patient summary reviewed and Nursing notes reviewed   NPO Status: > 8 hours   Airway   Mallampati: II  Neck ROM: full  no difficulty expected  Dental      Comment: edentuless upper, por dentition lower    Pulmonary     breath sounds clear to auscultation  (+) a smoker Former, asthma,   Cardiovascular     Rhythm: regular    (+) hypertension, PVD,       Neuro/Psych  (+) headaches, numbness, psychiatric history Anxiety and Depression,    GI/Hepatic/Renal/Endo    (+)  hiatal hernia, GERD,     Musculoskeletal     (+) neck pain,   Abdominal    Substance History      OB/GYN          Other   (+) arthritis                                 Anesthesia Plan    ASA 2     general     intravenous induction

## 2017-03-15 NOTE — ANESTHESIA POSTPROCEDURE EVALUATION
Patient: Sundeep Mason    Procedure Summary     Date Anesthesia Start Anesthesia Stop Room / Location    03/15/17 0911 1103  [facility] / [facility]       Procedure Diagnosis Surgeon Provider    TOTAL KNEE ARTHROPLASTY WITH TORI NAVIGATION (Right Knee) Arthritis of right knee; Chronic pain of right knee  (Arthritis of right knee [M19.90]; Chronic pain of right knee [M25.561, G89.29]) MD Gerry Smith MD          Anesthesia Type: general  Last vitals  /80 (03/15/17 1215)    Temp      Pulse 69 (03/15/17 1215)   Resp 16 (03/15/17 1215)    SpO2 95 % (03/15/17 1215)      Post Anesthesia Care and Evaluation    Patient location during evaluation: PACU  Patient participation: complete - patient participated  Level of consciousness: awake and alert  Pain management: adequate  Airway patency: patent  Anesthetic complications: No anesthetic complications    Cardiovascular status: acceptable  Respiratory status: acceptable  Hydration status: acceptable

## 2017-03-16 PROBLEM — M17.11 ARTHRITIS OF RIGHT KNEE: Status: ACTIVE | Noted: 2017-03-16

## 2017-03-29 ENCOUNTER — OFFICE VISIT (OUTPATIENT)
Dept: ORTHOPEDIC SURGERY | Facility: CLINIC | Age: 77
End: 2017-03-29

## 2017-03-29 VITALS — BODY MASS INDEX: 26.98 KG/M2 | TEMPERATURE: 97.6 F | WEIGHT: 178 LBS | HEIGHT: 68 IN

## 2017-03-29 DIAGNOSIS — Z96.651 STATUS POST TOTAL RIGHT KNEE REPLACEMENT: Primary | ICD-10-CM

## 2017-03-29 PROCEDURE — 99024 POSTOP FOLLOW-UP VISIT: CPT | Performed by: ORTHOPAEDIC SURGERY

## 2017-03-29 PROCEDURE — 73560 X-RAY EXAM OF KNEE 1 OR 2: CPT | Performed by: ORTHOPAEDIC SURGERY

## 2017-03-29 RX ORDER — OXYCODONE AND ACETAMINOPHEN 7.5; 325 MG/1; MG/1
TABLET ORAL
Qty: 100 TABLET | Refills: 0 | Status: SHIPPED | OUTPATIENT
Start: 2017-03-29 | End: 2017-04-25 | Stop reason: SDUPTHER

## 2017-03-29 NOTE — PROGRESS NOTES
Sundeep Mason : 1940 MRN: [id number] DATE: 3/29/2017  Body mass index is 27.06 kg/(m^2).  Vitals:    17 1343   Temp: 97.6 °F (36.4 °C)       DIAGNOSIS: 2 week follow up right total knee arthroplasty    SUBJECTIVE:Patient returns today for 2 week follow up of right total knee replacement. Patient reports doing well with no unusual complaints. Appears to be progressing appropriately.    OBJECTIVE:   Exam:. The incision is healing appropriately. No sign of infection. Range of motion is progressing as expected. The calf is soft and nontender with a negative Homans sign.    DIAGNOSTIC STUDIES  Xrays: 2 views of the right knee (AP full length and lateral) were ordered and reviewed for evaluation of recent knee replacement. They demonstrate a well positioned, well aligned knee replacement without complicating factors noted. In comparison with previous films there has been interval implant placement.    ASSESSMENT: 2 week status post right total knee replacement expected healing.    PLAN: 1) Staples removed and steri strips applied   2) Order given for PT and pain medicine (as needed)   3) Continue JORDAN hose for four weeks   4) Continue ice PRN   5) Total Knee:  Discontinue Xarelto and begin EC Aspirin 325mg po twice a day until 6 weeks post op.   6) Follow up in 4 weeks with repeat Xrays of right knee (2 views)   7) Continue with antibiotic prophylaxis with dental procedures for 2 yrs post total joint replacement.    Lg Jaramillo MD  3/29/2017     Pain Medications utilized after surgery are narcotics and the law requires that the following information be given to all patients that are prescribed narcotics:    CLASSIFICATION: Pain medications are called Opioids and are narcotics  LEGALITIES: It is illegal to share narcotics with others and to drive within 4 hours of taking narcotics  POTENTIAL SIDE EFFECTS: Potential side effects of opioids include: nausea, vomiting, itching, dizziness, drowsiness, dry  mouth, constipation, and difficulty urinating.  POTENTIAL ADVERSE EFFECTS:   Opioid tolerance can develop with use of pain medications and this simply means that it requires more and more of the medication to control pain; however, this is seen more in patients that use opioids for longer periods of time.  Opioid dependence can develop with use of Opioids and this simply means that to stop the medication can cause withdrawal symptoms so wean gradually (do not stop all at once); however, this is seen more with patients that use opioids for longer periods of time.  Opioid addiction can develop with use of Opioids and the incidence of this is very unlikely in patients who take the medications as ordered and stop the medications as instructed.  Opioid overdose can be dangerous, but is unlikely when the medication is taken as ordered and stopped when ordered. It is important not to mix opioids with alcohol or with and type of sedative such as Benadryl as this can lead to over sedation and respiratory difficulty.    DOSAGE:   Pain medications will need to be taken consistently for the first week to decrease pain and promote adequate pain relief and participation in physical therapy.    After the initial surgical pain begins to resolve, you may begin to decrease the pain medication. By the end of 8 weeks, you should be off of pain medications.    Refills will not be given by the office during evening hours, on weekends.  To seek refills on pain medications during the initial 6 week post-operative period, you must call the office 48 hours in advance to request the refill. The office will then notify you when to  the prescription. DO NOT wait until you are out of the medication to request a refill.

## 2017-04-25 ENCOUNTER — OFFICE VISIT (OUTPATIENT)
Dept: ORTHOPEDIC SURGERY | Facility: CLINIC | Age: 77
End: 2017-04-25

## 2017-04-25 VITALS — BODY MASS INDEX: 27.28 KG/M2 | HEIGHT: 68 IN | WEIGHT: 180 LBS | TEMPERATURE: 97.6 F

## 2017-04-25 DIAGNOSIS — Z96.651 STATUS POST TOTAL RIGHT KNEE REPLACEMENT: Primary | ICD-10-CM

## 2017-04-25 PROCEDURE — 77077 JOINT SURVEY SINGLE VIEW: CPT | Performed by: ORTHOPAEDIC SURGERY

## 2017-04-25 PROCEDURE — 99024 POSTOP FOLLOW-UP VISIT: CPT | Performed by: ORTHOPAEDIC SURGERY

## 2017-04-25 PROCEDURE — 73560 X-RAY EXAM OF KNEE 1 OR 2: CPT | Performed by: ORTHOPAEDIC SURGERY

## 2017-04-25 RX ORDER — OXYCODONE AND ACETAMINOPHEN 7.5; 325 MG/1; MG/1
TABLET ORAL
Qty: 100 TABLET | Refills: 0 | OUTPATIENT
Start: 2017-04-25 | End: 2019-05-02

## 2017-04-25 RX ORDER — METHOCARBAMOL 750 MG/1
750 TABLET, FILM COATED ORAL EVERY 6 HOURS PRN
Qty: 60 TABLET | Refills: 2 | OUTPATIENT
Start: 2017-04-25 | End: 2019-05-02

## 2017-04-25 NOTE — PROGRESS NOTES
Sundeep Mason : 1940 MRN: [id number] DATE: 2017  Body mass index is 27.37 kg/(m^2).  Vitals:    17 1554   Temp: 97.6 °F (36.4 °C)       Chief Complaint and HPI: 6 weeks follow up right total knee.  Getting a needle biopsy of his lungs.    SUBJECTIVE:Patient returns today for follow up of total joint replacement. Patient reports doing well with no unusual complaints. Appears to be progressing appropriately.    OBJECTIVE:   Exam:. The incision is healing appropriately. No sign of infection. Range of motion is progressing as expected. The calf is soft and nontender with a negative Homans sign.  Mowing grass etc but co soreness tevin at night.  overdoing    DIAGNOSTIC STUDIES  Xrays: 2 views of the rightknee (AP full length and lateral) were ordered and reviewed for evaluation of recent joint replacement. They demonstrate a well positioned, well aligned total joint replacement without complicating factors noted. In comparison with previous films there has been no alignment change.    ASSESSMENT: status post right total knee replacement expected healing.    PLAN: 1) Continue with PT exercises as prescribed   2) Follow up 3 MONTHS  WITH XRAYS (2 views of same joint).   3) Continue with antibiotic prophylaxis with dental procedures for 2 yrs post total joint replacement.   4) May discontinue Aspirin therapy from surgery at this time and resume medications, vitamins, and supplements you were taking before surgery.     Lg Jaramillo MD  2017     Pain Medications utilized after surgery are narcotics and the law requires that the following information be given to all patients that are prescribed narcotics:    CLASSIFICATION: Pain medications are called Opioids and are narcotics  LEGALITIES: It is illegal to share narcotics with others and to drive within 4 hours of taking narcotics  POTENTIAL SIDE EFFECTS: Potential side effects of opioids include: nausea, vomiting, itching, dizziness, drowsiness, dry  mouth, constipation, and difficulty urinating.  POTENTIAL ADVERSE EFFECTS:   Opioid tolerance can develop with use of pain medications and this simply means that it requires more and more of the medication to control pain; however, this is seen more in patients that use opioids for longer periods of time.  Opioid dependence can develop with use of Opioids and this simply means that to stop the medication can cause withdrawal symptoms so wean gradually (do not stop all at once); however, this is seen more with patients that use opioids for longer periods of time.  Opioid addiction can develop with use of Opioids and the incidence of this is very unlikely in patients who take the medications as ordered and stop the medications as instructed.  Opioid overdose can be dangerous, but is unlikely when the medication is taken as ordered and stopped when ordered. It is important not to mix opioids with alcohol or with and type of sedative such as Benadryl as this can lead to over sedation and respiratory difficulty.    DOSAGE:   Pain medications will need to be taken consistently for the first week to decrease pain and promote adequate pain relief and participation in physical therapy.    After the initial surgical pain begins to resolve, you may begin to decrease the pain medication. By the end of 8 weeks, you should be off of pain medications.    Refills will not be given by the office during evening hours, on weekends.  To seek refills on pain medications during the initial 6 week post-operative period, you must call the office 48 hours in advance to request the refill. The office will then notify you when to  the prescription. DO NOT wait until you are out of the medication to request a refill.

## 2017-06-05 ENCOUNTER — TELEPHONE (OUTPATIENT)
Dept: ORTHOPEDIC SURGERY | Facility: CLINIC | Age: 77
End: 2017-06-05

## 2017-06-06 ENCOUNTER — OFFICE VISIT (OUTPATIENT)
Dept: ORTHOPEDIC SURGERY | Facility: CLINIC | Age: 77
End: 2017-06-06

## 2017-06-06 VITALS — WEIGHT: 178.8 LBS | HEIGHT: 68 IN | BODY MASS INDEX: 27.1 KG/M2 | TEMPERATURE: 98.2 F

## 2017-06-06 DIAGNOSIS — Z96.651 STATUS POST TOTAL RIGHT KNEE REPLACEMENT: Primary | ICD-10-CM

## 2017-06-06 PROCEDURE — 99024 POSTOP FOLLOW-UP VISIT: CPT | Performed by: NURSE PRACTITIONER

## 2017-06-06 PROCEDURE — 73560 X-RAY EXAM OF KNEE 1 OR 2: CPT | Performed by: NURSE PRACTITIONER

## 2017-06-06 NOTE — PATIENT INSTRUCTIONS
[facility]    KNEE HOME EXERCISES      It is important that you maintain and possibly improve your strength and range of motion of your knee.      •  Walk frequently for short intervals  •  Using a cane or walker to allow you to walk safely if needed  •  Avoid sitting for long periods of time to avoid cramping and swelling of your leg   •  Do exercises either on a bed or in a chair.  •  If any of the exercises cause you pain, either eliminate that exercise or decrease          the motion or repetitions      Start exercises with 10 repetitions and increase to 30 repetitions.  Do two sets of each exercise each day    ANKLE PUMPS    1. Move your feet up and down as if you are pumping on a gas pedal       STRAIGHT LEG RAISES    1. Lie flat with your injured leg straight.  Keep the other leg bent.  2. Tighten the injured leg's thigh muscle.  3. Lift leg, with knee locked in the straight position no higher than the other knee for  seconds  4. Lower leg slowly. Rest for 5 seconds      SHORT ARC QUAD    1. Lie with both knees bent over a pillow  2. Straighten both knees  3. Hold 5 seconds  4. Bend knees back to starting position and repeat sequence       QUADRICEPS     1. Lie flat with your injured let straight.  Keep the other leg bent.  2. Tighten the injured leg's thigh muscle by trying to move your kneecap toward the  thigh.  3. Make your leg as stiff as you can.  4. Hold for 5 seconds and relax for 5 seconds        HAMSTRING STRETCH    1. Lie flat with your injured leg straight. Keep the other leg bent  2. Press your heel of your injured leg into the floor for 5 seconds       OR  1. Sit with injured leg out straight.   2. Loop a sheet around the ball of foot and toes.  3. Gently pull on the sheet, keeping the leg straight  4. Hold for 10-30 seconds      KNEE FLEXION-EXTENSION SITTING     1. Sit with injured let bent as shown  2. Straighten leg at the knee  3. Hold 5 seconds  4. Bend knee back  to starting position   5. Rest for 2-5 seconds and repeat sequence       HEEL SLIDES     1. Lie on back with legs straight  2. Slide heels toward buttocks  3. Return to starting position       HEEL SLIDS WITH SHEET    1. Lie on your back with a sheet wrapped around your foot  2. Pull on the sheet to assist you in bending your knee and sliding your heel toward  your buttocks  3. Hold for 5 seconds        KNEE FLEXION STRETCH    1. Sit in a chair  2. Bend bad knee back as far as you can   3. Hold stretch for 5-10 seconds      CHAIR PUSH UPS    1. Sit in a chair with arm rests  2. Place hands on armrests  3. Straighten arms, raising buttocks up off of chair         WALL SLIDES    1. Stand with your back and head against a wall.    2. Keep your feet shoulder width apart and 6-12 inches from the wall  3. Slowly slide down the wall until your knees are slightly bent.    4. Hold for 5 seconds and then slowly slide back up  5. As you get stronger, then you can slowly increase the bend in your knees, but do  not drop your buttocks below the level of your knees       STEP UPS    1. Stand with your foot on your injured leg on a 3-5 inch support (such as a block of  wood)  2. Place other foot on the floor  3. Shift your weight onto the foot on the block and try to straighten the knee and  raising the other foot off of the floor  4. Slowly lower your foot until only the heel touches the floor

## 2017-06-06 NOTE — PROGRESS NOTES
NAME: Sundeep Mason  : 1940   MRN: [id number]   DATE: 2017    CC: 3 months Follow up status post total joint replacement    SUBJECTIVE:    HPI: Patient returns today for a follow up of total joint replacement. Patient reports doing well with no unusual complaints. Appears to be progressing appropriately. Is getting ready to have lung nodules operated on and wanted to be sure ok to proceed with surgery from knee replacement perspective.   HPI    This problem is not new to this examiner.     Allergies: No Known Allergies    Medications:   Home Medications:  Current Outpatient Prescriptions on File Prior to Visit   Medication Sig   • atorvastatin (LIPITOR) 20 MG tablet Take 20 mg by mouth daily.   • famotidine (PEPCID) 20 MG tablet Take 20 mg by mouth 2 (two) times a day.   • losartan (COZAAR) 50 MG tablet Take 50 mg by mouth Daily.   • methocarbamol (ROBAXIN) 750 MG tablet Take 1 tablet by mouth Every 6 (Six) Hours As Needed for Muscle Spasms.   • oxyCODONE-acetaminophen (PERCOCET) 7.5-325 MG per tablet 1-2 tabs po q 3-4 hr prn severe pain, wean as tolerated   • Vortioxetine HBr 5 MG tablet Take 5 mg by mouth.   • [DISCONTINUED] bisacodyl (DULCOLAX) 5 MG EC tablet Take 2 tablets by mouth Daily As Needed for Constipation.   • [DISCONTINUED] docusate sodium 100 MG capsule Take 100 mg by mouth 2 (Two) Times a Day.   • [DISCONTINUED] ondansetron (ZOFRAN) 4 MG tablet Take 1 tablet by mouth Every 6 (Six) Hours As Needed for Nausea or Vomiting.     No current facility-administered medications on file prior to visit.        Current Medications:  Scheduled Meds:  Continuous Infusions:  No current facility-administered medications for this visit.   PRN Meds:.    I have reviewed the patient's medical history in detail and updated the computerized patient record.  Review and summarization of old records include:    Past Medical History:   Diagnosis Date   • Anxiety and depression     RECENT LOSS OF HIS WIFE; PT STATED  THAT HE FELT LIKE DYING EVERYDAY BUT ISN'T SUICIDAL; ENCOURAGED HIM TO TALK TO PCP ABOUT GETTING HELP.   • Arthritis    • Asthma    • Chronic chest pain    • DDD (degenerative disc disease), cervical    • GERD (gastroesophageal reflux disease)    • Headache    • Hiatal hernia    • History of kidney stones    • Hx of staphylococcal infection     AFTER LEFT TKA   • Hyperlipidemia    • Hypertension    • Lung nodules    • Neck and shoulder pain    • Numbness or tingling    • Open wound of arm     RIGHT ARM; HEALING   • PVD (peripheral vascular disease)         Past Surgical History:   Procedure Laterality Date   • CERVICAL DISCECTOMY ANTERIOR     • ROTATOR CUFF REPAIR Bilateral    • TONSILLECTOMY     • TOTAL KNEE ARTHROPLASTY Left    • TOTAL KNEE ARTHROPLASTY Right 3/15/2017    Procedure: TOTAL KNEE ARTHROPLASTY WITH TORI NAVIGATION;  Surgeon: Lg Jaramillo MD;  Location: [facility] OR;  Service:         Social History     Occupational History   • Not on file.     Social History Main Topics   • Smoking status: Former Smoker     Quit date: 7/1/2001   • Smokeless tobacco: Never Used   • Alcohol use Yes      Comment: RARE USE   • Drug use: No   • Sexual activity: Not on file    Social History     Social History Narrative        Family History   Problem Relation Age of Onset   • Hypertension Other    • Heart disease Other    • Diabetes Other        ROS: 14 point review of systems was performed and was negative except for documented findings in HPI and today's encounter.     Allergies: No Known Allergies  Constitutional:  Denies fever, shaking or chills   Eyes:  Denies change in visual acuity   HENT:  Denies nasal congestion or sore throat   Respiratory:  Denies cough or shortness of breath   Cardiovascular:  Denies chest pain or severe LE edema   GI:  Denies abdominal pain, nausea, vomiting, bloody stools or diarrhea   Musculoskeletal:  Denies numbness tingling or loss of motor function except as outlined above in  history of present illness.  : Denies painful urination or hematuria  Integument:  Denies rash, lesion or ulceration   Neurologic:  Denies headache or focal weakness  Endocrine:  Denies lymphadenopathy  Psych:  Denies confusion or change in mental status   Hem:  Denies active bleeding        OBJECTIVE:     Physical Exam:  Vital Signs:  Body mass index is 27.19 kg/(m^2).  Vitals:    06/06/17 1120   Temp: 98.2 °F (36.8 °C)       Constitutional: Awake alert and oriented x3, well developed, well nourished, no acute distress, non-toxic appearance.  HEENT:  Normocephalic, Atraumatic, Bilateral external ears normal, Oropharynx moist, No oral exudates, Nose normal.   Respiratory:  No respiratory distress, No wheezing  CV: No palpitations  Vascular:  Brisk cap refill, Intact distal pulses, No cyanosis, all compartments soft with no signs or symptoms of compartment syndrome or DVT.  Neurologic: Sensation grossly intact to light touch throughout the involved extremity and bilaterally symmetric, deep tendon reflexes are 2+ and bilaterally symmetric, No focal deficits noted.   Neck:  Normal range of motion, No tenderness, Supple, Negative Spurlings.  Integument: Well hydrated, no rash, warm, dry, no lesions or ulceration.   Musculoskeletal:  Affected extremity post op incision is healed appropriately. No sign of infection. Range of motion is progressing as expected. The calf is soft and nontender with a negative Homans sign. Distal pulses intact. Normal amount of swelling present for recovery time.     DIAGNOSTIC STUDIES  Xrays: 2 views of the right knee (AP full length and lateral) were ordered and reviewed for evaluation of past joint replacement. They demonstrate a well positioned, well aligned total joint replacement without complicating factors noted. In comparison with the film from the last office visit, there has been no alignment change.    ASSESSMENT: Status post right total knee replacement with expected  healing    PLAN: 1) Continue home PT exercises as needed.   2) Continue with antibiotic prophylaxis with dental procedures for 2 yrs post total joint replacement.   3) Follow up as ordered.    6/6/2017  Patient was seen by JULIETTE Cedillo in the office today.

## 2017-07-10 ENCOUNTER — OFFICE VISIT (OUTPATIENT)
Dept: ORTHOPEDIC SURGERY | Facility: CLINIC | Age: 77
End: 2017-07-10

## 2017-07-10 VITALS — TEMPERATURE: 97.6 F | WEIGHT: 177 LBS | HEIGHT: 68 IN | BODY MASS INDEX: 26.83 KG/M2

## 2017-07-10 DIAGNOSIS — M16.11 ARTHRITIS OF RIGHT HIP: ICD-10-CM

## 2017-07-10 DIAGNOSIS — Z96.651 STATUS POST TOTAL RIGHT KNEE REPLACEMENT: ICD-10-CM

## 2017-07-10 DIAGNOSIS — M25.551 HIP PAIN, RIGHT: Primary | ICD-10-CM

## 2017-07-10 PROCEDURE — 73560 X-RAY EXAM OF KNEE 1 OR 2: CPT | Performed by: NURSE PRACTITIONER

## 2017-07-10 PROCEDURE — 73502 X-RAY EXAM HIP UNI 2-3 VIEWS: CPT | Performed by: NURSE PRACTITIONER

## 2017-07-10 PROCEDURE — 99213 OFFICE O/P EST LOW 20 MIN: CPT | Performed by: NURSE PRACTITIONER

## 2017-07-10 NOTE — PROGRESS NOTES
Patient Name: Sundeep Mason   YOB: 1940  Referring Primary Care Physician: Harry Murrell MD      Chief Complaint:    Chief Complaint   Patient presents with   • Right Hip - Follow-up    Right hip pain and s/p R TKA    Subjective:    HPI:   Sundeep Mason is a pleasant 76 y.o. year old who presents today for evaluation of   Chief Complaint   Patient presents with   • Right Hip - Follow-up   . 4 mo post op right TKA doing well with some residual stiffness and aching but right hip is bothering him more and more and is also going through treatment for lung mass, and is needing to have lung removed before other surgery considerations at this time.    HIP: TIMING:  The pain is described as ACUTE ON CHRONIC.  AGGRAVATING FACTORS:  Is worsened by prolonged standing, sitting, and walking activities.  CHARACTERISTICS:  aching, stiffness, and difficulty walking., LOCATION: groin pain and buttock pain, ASSOCIATED SYMPTOMS:  Denies numbness, tingling or radicular symptoms. and RELIEVING FACTORS:  Previous treatment has included physical therapy  has been doing PT exercises for R TKA recovery and is going well but right leg muscles are stiff and increasingly painful from hip area. .    This problem is new to this examiner.     Medications:   Home Medications:  Current Outpatient Prescriptions on File Prior to Visit   Medication Sig   • atorvastatin (LIPITOR) 20 MG tablet Take 20 mg by mouth daily.   • famotidine (PEPCID) 20 MG tablet Take 20 mg by mouth 2 (two) times a day.   • losartan (COZAAR) 50 MG tablet Take 50 mg by mouth Daily.   • methocarbamol (ROBAXIN) 750 MG tablet Take 1 tablet by mouth Every 6 (Six) Hours As Needed for Muscle Spasms.   • oxyCODONE-acetaminophen (PERCOCET) 7.5-325 MG per tablet 1-2 tabs po q 3-4 hr prn severe pain, wean as tolerated   • Vortioxetine HBr 5 MG tablet Take 5 mg by mouth.     No current facility-administered medications on file prior to visit.      Current  Medications:  Scheduled Meds:  Continuous Infusions:  No current facility-administered medications for this visit.   PRN Meds:.    I have reviewed the patient's medical history in detail and updated the computerized patient record.  Review and summarization of old records includes:    Past Medical History:   Diagnosis Date   • Anxiety and depression     RECENT LOSS OF HIS WIFE; PT STATED THAT HE FELT LIKE DYING EVERYDAY BUT ISN'T SUICIDAL; ENCOURAGED HIM TO TALK TO PCP ABOUT GETTING HELP.   • Arthritis    • Asthma    • Chronic chest pain    • DDD (degenerative disc disease), cervical    • GERD (gastroesophageal reflux disease)    • Headache    • Hiatal hernia    • History of kidney stones    • Hx of staphylococcal infection     AFTER LEFT TKA   • Hyperlipidemia    • Hypertension    • Lung nodules    • Neck and shoulder pain    • Numbness or tingling    • Open wound of arm     RIGHT ARM; HEALING   • PVD (peripheral vascular disease)         Past Surgical History:   Procedure Laterality Date   • CERVICAL DISCECTOMY ANTERIOR     • ROTATOR CUFF REPAIR Bilateral    • TONSILLECTOMY     • TOTAL KNEE ARTHROPLASTY Left    • TOTAL KNEE ARTHROPLASTY Right 3/15/2017    Procedure: TOTAL KNEE ARTHROPLASTY WITH TORI NAVIGATION;  Surgeon: Lg Jaramillo MD;  Location: [facility] OR;  Service:         Social History     Occupational History   • Not on file.     Social History Main Topics   • Smoking status: Former Smoker     Quit date: 7/1/2001   • Smokeless tobacco: Never Used   • Alcohol use Yes      Comment: RARE USE   • Drug use: No   • Sexual activity: Not on file    Social History     Social History Narrative        Family History   Problem Relation Age of Onset   • Hypertension Other    • Heart disease Other    • Diabetes Other        ROS: 14 point review of systems was performed and all other systems were reviewed and are negative except for documented findings in HPI and today's encounter.     Allergies: No Known  Allergies  Constitutional:  Denies fever, shaking or chills   Eyes:  Denies change in visual acuity   HENT:  Denies nasal congestion or sore throat   Respiratory:  Denies cough or shortness of breath   Cardiovascular:  Denies chest pain or severe LE edema   GI:  Denies abdominal pain, nausea, vomiting, bloody stools or diarrhea   Musculoskeletal:  Numbness, tingling, pain, or loss of motor function only as noted above in history of present illness.  : Denies painful urination or hematuria  Integument:  Denies rash, lesion or ulceration   Neurologic:  Denies headache or focal weakness  Endocrine:  Denies lymphadenopathy  Psych:  Denies confusion or change in mental status   Hem:  Denies active bleeding    Objective:    Physical Exam: 76 y.o. male  Body mass index is 26.91 kg/(m^2).  Vitals:    07/10/17 1115   Temp: 97.6 °F (36.4 °C)     Vital signs reviewed.   General Appearance:    Alert, cooperative, in no acute distress                  Eyes: conjunctiva clear  ENT: external ears and nose atraumatic  CV: no peripheral edema  Resp: normal respiratory effort  Skin: no rashes or wounds; normal turgor  Psych: mood and affect appropriate  Lymph: no nodes appreciated  Neuro: gross sensation intact  Vascular:  Palpable peripheral pulse in noted extremity  Musculoskeletal Extremities: HIP Exam: antalgic gait with assistive device right hip, Stinchfield postitive, pain with internal rotation, stiffness, CHASE test positive and guteal pain 2+ pedal pulses and brisk capillary refill Pedal edema none    Radiology:   Imaging done today and discussed at length with the patient:    Indication: pain related symptoms,  Views: 2V AP&LAT right hip(s)   Findings: severe end-stage arthritis (bone on bone, subchondral sclerosis/cysts, osteophytes)  Comparison views: viewed last xray done in the office. , Additional Views: 2V AP&LAT right knee(s) Findings: severe end-stage arthritis (bone on bone, subchondral sclerosis/cysts,  osteophytes) Comparison views: viewed last xray done in the office.       Assessment:     ICD-10-CM ICD-9-CM   1. Hip pain, right M25.551 719.45   2. Status post total right knee replacement Z96.651 V43.65        Procedures       Plan: Biomechanics of pertinent body area discussed.  Risks, benefits, alternatives, comparisons, and complications of accepted medicines, injections, recommendations, surgical procedures, and therapies explained and education provided in laymen's terms. The patient was given the opportunity to ask questions and they were answerved to their satisfaction.   Natural history and expected course discussed. Questions answered.  Advice on benefits of, and types of regular/moderate exercise.  Lifestyle measures for weight loss with biomechanical explanations for weight loss and how this affects orthopedic condition.  Cortisone Injection for DIAGNOSTIC and THERAPUTIC purposes.  15 min spent face to face with patient >10 min spent counseling about natural history and expected course of assessed complaint. Questions answered.   Needs R HAZEL but needs to get lungs taken care of first.   4 mo post op right TKA doing well with some residual stiffness and aching but right hip is bothering him more and more and is also going through treatment for lung mass, and is needing to have lung removed before other surgery considerations at this time. Will schedule for R Hip cortisone inj.       7/10/2017  Elida Kaplan, APRN

## 2017-07-11 ENCOUNTER — HOSPITAL ENCOUNTER (OUTPATIENT)
Dept: GENERAL RADIOLOGY | Facility: HOSPITAL | Age: 77
Discharge: HOME | End: 2017-07-11
Admitting: NURSE PRACTITIONER

## 2017-07-11 DIAGNOSIS — M16.11 ARTHRITIS OF RIGHT HIP: ICD-10-CM

## 2017-07-11 DIAGNOSIS — M25.551 HIP PAIN, RIGHT: ICD-10-CM

## 2017-07-11 PROCEDURE — 77002 NEEDLE LOCALIZATION BY XRAY: CPT

## 2017-07-11 PROCEDURE — 0 IOPAMIDOL 61 % SOLUTION: Performed by: RADIOLOGY

## 2017-07-11 PROCEDURE — 25010000002 METHYLPREDNISOLONE PER 125 MG: Performed by: RADIOLOGY

## 2017-07-11 RX ORDER — BUPIVACAINE HYDROCHLORIDE 2.5 MG/ML
10 INJECTION, SOLUTION EPIDURAL; INFILTRATION; INTRACAUDAL ONCE
Status: COMPLETED | OUTPATIENT
Start: 2017-07-11 | End: 2017-07-11

## 2017-07-11 RX ORDER — METHYLPREDNISOLONE SODIUM SUCCINATE 125 MG/2ML
80 INJECTION, POWDER, LYOPHILIZED, FOR SOLUTION INTRAMUSCULAR; INTRAVENOUS
Status: COMPLETED | OUTPATIENT
Start: 2017-07-11 | End: 2017-07-11

## 2017-07-11 RX ORDER — LIDOCAINE HYDROCHLORIDE 10 MG/ML
10 INJECTION, SOLUTION INFILTRATION; PERINEURAL ONCE
Status: COMPLETED | OUTPATIENT
Start: 2017-07-11 | End: 2017-07-11

## 2017-07-11 RX ADMIN — BUPIVACAINE HYDROCHLORIDE 5 ML: 2.5 INJECTION, SOLUTION EPIDURAL; INFILTRATION; INTRACAUDAL; PERINEURAL at 09:34

## 2017-07-11 RX ADMIN — IOPAMIDOL 1 ML: 612 INJECTION, SOLUTION INTRAVENOUS at 09:34

## 2017-07-11 RX ADMIN — LIDOCAINE HYDROCHLORIDE 3 ML: 10 INJECTION, SOLUTION INFILTRATION; PERINEURAL at 09:34

## 2017-07-11 RX ADMIN — METHYLPREDNISOLONE SODIUM SUCCINATE 80 MG: 125 INJECTION, POWDER, LYOPHILIZED, FOR SOLUTION INTRAMUSCULAR; INTRAVENOUS at 09:34

## 2017-07-21 ENCOUNTER — TELEPHONE (OUTPATIENT)
Dept: ORTHOPEDIC SURGERY | Facility: CLINIC | Age: 77
End: 2017-07-21

## 2017-07-21 NOTE — TELEPHONE ENCOUNTER
Noted surgery ok 2-3mo after injection. Patient had R Hip FL guided pain inj at [facility] 7/11/17 - he asked how long before he can get regular natali inj in R hip?

## 2017-07-24 NOTE — TELEPHONE ENCOUNTER
Called and spoke to Sundeep about getting R hip replacement.  It would be good to wait 2-3months from injection before doing the surgery, verb understanding.  Will consider and call us back if he would like to go ahead and schedule R hip surgery.  He has decided to wait and watch the lung mass at present rather than having the lung resection at this time as the needle biposy was suspicious but inconclusive.  Per Dr. Clayton (heart & lung MD). MJR/spm

## 2017-08-18 ENCOUNTER — TELEPHONE (OUTPATIENT)
Dept: ORTHOPEDIC SURGERY | Facility: CLINIC | Age: 77
End: 2017-08-18

## 2017-08-18 NOTE — TELEPHONE ENCOUNTER
Called and talked to patient.  He had cortisone inj in the right hip on 7/11 and has had other health reasons delaying his ability to do R HAZEL.  Has severe degeneration of his right hip and is using cane for assistance.  Explained could only give a 3 day supply of pain meds.  He states he will bear with the pain at present.  He will let us know when he wants to schedule hip replacement surgery.  DARLIN

## 2018-07-20 NOTE — PROGRESS NOTES
Patient Name: Sundeep Mason   YOB: 1940  Referring Primary Care Physician: Harry Murrell MD      Chief Complaint:    Chief Complaint   Patient presents with   • Right Knee - Follow-up        Subjective:  This problem is not new to this examiner.     HPI:   Sundeep Mason is a pleasant 76 y.o. year old who presents today for evaluation of   Chief Complaint   Patient presents with   • Right Knee - Follow-up   .  TIMING:  The pain is described as ACUTE on CHRONIC.  LOCATION: medial joint line tenderness. AGGRAVATING FACTORS:  Is worsened by prolonged standing, sitting, walking and squatting activities.  ASSOCIATED SYMPTOMS:  swelling, tenderness, and aching. OTHER SYMPTOMS denies popping, locking or catching. RELIEVING FACTORS:  Previous treatment has included cortisone injections  OTC Tylenol  OTC meds/NSAIDS  prescription NSAIDS  activtiy modification  physical therapy  ice/heat/rest.    Medications:   Home Medications:  Current Outpatient Prescriptions on File Prior to Visit   Medication Sig   • aspirin 81 MG tablet Take 81 mg by mouth daily.   • atorvastatin (LIPITOR) 20 MG tablet Take 20 mg by mouth daily.   • famotidine (PEPCID) 20 MG tablet Take 20 mg by mouth 2 (two) times a day.   • Naproxen Sodium (ALEVE PO) Take  by mouth.     No current facility-administered medications on file prior to visit.      Current Medications:  Scheduled Meds:  Continuous Infusions:  No current facility-administered medications for this visit.   PRN Meds:.    I have reviewed the patient's medical history in detail and updated the computerized patient record.  Review and summarization of old records includes:    Past Medical History   Diagnosis Date   • Asthma    • Hypertension    • Numbness or tingling         Past Surgical History   Procedure Laterality Date   • Rotator cuff repair     • Knee surgery Left    • Neck surgery          Social History     Occupational History   • Not on file.     Social History Main  Topics   • Smoking status: Former Smoker     Quit date: 7/1/2001   • Smokeless tobacco: Never Used   • Alcohol use Defer   • Drug use: Defer   • Sexual activity: Defer    Social History     Social History Narrative        Family History   Problem Relation Age of Onset   • Hypertension Other    • Heart disease Other    • Diabetes Other          ROS: 14 point review of systems was performed and all other systems were reviewed and are negative except for documented findings in HPI and today's encounter.     Allergies: No Known Allergies  Constitutional:  Denies fever, shaking or chills   Eyes:  Denies change in visual acuity   HENT:  Denies nasal congestion or sore throat   Respiratory:  Denies cough or shortness of breath   Cardiovascular:  Denies chest pain or severe LE edema   GI:  Denies abdominal pain, nausea, vomiting, bloody stools or diarrhea   Musculoskeletal:  Numbness, tingling, or loss of motor function only as noted above in history of present illness.  : Denies painful urination or hematuria  Integument:  Denies rash, lesion or ulceration   Neurologic:  Denies headache or focal weakness  Endocrine:  Denies lymphadenopathy  Psych:  Denies confusion or change in mental status   Hem:  Denies active bleeding    Objective:    Physical Exam: 76 y.o. male  Body mass index is 29.76 kg/(m^2).  Vitals:    02/14/17 1320   Temp: 97.6 °F (36.4 °C)     Vital signs reviewed.   General Appearance:    Alert, cooperative, in no acute distress                  Eyes: conjunctiva clear  ENT: external ears and nose atraumatic  CV: no peripheral edema  Resp: normal respiratory effort  Skin: no rashes or wounds; normal turgor  Psych: mood and affect appropriate  Lymph: no nodes appreciated  Neuro: gross sensation intact  Vascular:  Palpable peripheral pulse in noted extremity  Musculoskeletal Extremities: DETAILED KNEE Exam: Right knee: Painful gait w/wo limp, muscle atrophy, erythema, ecchymosis, or gross deformity noted,  Large knee effusion, + medial joint line tenderness, Active range of motion normal, 5/5 strength flexion and extension, The knee is stable to varus and valgus stress testing, VARUS VALGUS NEUTRAL: varus alignment of the limb, Lachman negative, Posterior drawer negative, Diamante's negative, Patellofemoral grind +, Sensation grossly intact to light tough throughout the lower extremity, Skin is intact, Distal pulses are palpable, No signs or symptoms of DVT    Radiology:   Imaging done previously in the office and discussed at length with the patient:    Indication: pain related symptoms,  Views: 3V AP, LAT & 40 degree PA bilateral knee(s)   Findings: severe end-stage arthritis (bone on bone, subchondral sclerosis/cysts, osteophytes), S/P left  Total Knee Replacement in good position and alignment  Comparison views: viewed last xray done in the office.       Assessment:   Patient Active Problem List   Diagnosis   • S/P total knee replacement using cement   • Arthritis of right knee   • Chronic pain of right knee        Procedures       Plan: Biomechanics of pertinent body area discussed.  Risks, benefits, alternatives, comparisons, and complications of accepted medicines, injections, recommendations, surgical procedures, and therapies explained and education provided in laymen's terms. The patient was given the opportunity to ask questions and they were answerved to their satisfaction.   Natural history and expected course discussed. Questions answered.  Advice on benefits of, and types of regular/moderate exercise.  Lifestyle measures for weight loss with biomechanical explanations for weight loss and how this affects orthopedic condition.  PT referral offered and declined, advised on stretching/strengthening exercises.  Rest, ice, compression, and elevation (RICE) therapy.  Has pcp surg clear visit soon.    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   2/14/2017  Patient was seen by JULIETTE Cedillo in the  office today.     Spouse

## 2019-05-02 ENCOUNTER — HOSPITAL ENCOUNTER (EMERGENCY)
Facility: HOSPITAL | Age: 79
Discharge: HOME | End: 2019-05-02
Attending: EMERGENCY MEDICINE | Admitting: EMERGENCY MEDICINE

## 2019-05-02 ENCOUNTER — APPOINTMENT (OUTPATIENT)
Dept: CT IMAGING | Facility: HOSPITAL | Age: 79
End: 2019-05-02

## 2019-05-02 VITALS
HEART RATE: 65 BPM | TEMPERATURE: 97.4 F | BODY MASS INDEX: 25.7 KG/M2 | OXYGEN SATURATION: 97 % | RESPIRATION RATE: 16 BRPM | HEIGHT: 68 IN | SYSTOLIC BLOOD PRESSURE: 163 MMHG | WEIGHT: 169.6 LBS | DIASTOLIC BLOOD PRESSURE: 88 MMHG

## 2019-05-02 DIAGNOSIS — R41.3 MEMORY LOSS OR IMPAIRMENT: ICD-10-CM

## 2019-05-02 DIAGNOSIS — R20.2 PARESTHESIA: Primary | ICD-10-CM

## 2019-05-02 LAB
ALBUMIN SERPL-MCNC: 4.2 G/DL (ref 3.5–5.2)
ALBUMIN/GLOB SERPL: 1.4 G/DL
ALP SERPL-CCNC: 120 U/L (ref 39–117)
ALT SERPL W P-5'-P-CCNC: 26 U/L (ref 1–41)
ANION GAP SERPL CALCULATED.3IONS-SCNC: 11.9 MMOL/L
AST SERPL-CCNC: 27 U/L (ref 1–40)
BASOPHILS # BLD AUTO: 0.03 10*3/MM3 (ref 0–0.2)
BASOPHILS NFR BLD AUTO: 0.6 % (ref 0–1.5)
BILIRUB SERPL-MCNC: 0.4 MG/DL (ref 0.2–1.2)
BUN BLD-MCNC: 20 MG/DL (ref 8–23)
BUN/CREAT SERPL: 18.2 (ref 7–25)
CALCIUM SPEC-SCNC: 9.6 MG/DL (ref 8.6–10.5)
CHLORIDE SERPL-SCNC: 107 MMOL/L (ref 98–107)
CO2 SERPL-SCNC: 21.1 MMOL/L (ref 22–29)
CREAT BLD-MCNC: 1.1 MG/DL (ref 0.76–1.27)
DEPRECATED RDW RBC AUTO: 46.8 FL (ref 37–54)
EOSINOPHIL # BLD AUTO: 0.14 10*3/MM3 (ref 0–0.4)
EOSINOPHIL NFR BLD AUTO: 2.7 % (ref 0.3–6.2)
ERYTHROCYTE [DISTWIDTH] IN BLOOD BY AUTOMATED COUNT: 13.8 % (ref 12.3–15.4)
GFR SERPL CREATININE-BSD FRML MDRD: 65 ML/MIN/1.73
GLOBULIN UR ELPH-MCNC: 2.9 GM/DL
GLUCOSE BLD-MCNC: 119 MG/DL (ref 65–99)
HCT VFR BLD AUTO: 42.4 % (ref 37.5–51)
HGB BLD-MCNC: 13.9 G/DL (ref 13–17.7)
IMM GRANULOCYTES # BLD AUTO: 0.01 10*3/MM3 (ref 0–0.05)
IMM GRANULOCYTES NFR BLD AUTO: 0.2 % (ref 0–0.5)
INR PPP: 1.06 (ref 0.9–1.1)
LYMPHOCYTES # BLD AUTO: 1.21 10*3/MM3 (ref 0.7–3.1)
LYMPHOCYTES NFR BLD AUTO: 23.3 % (ref 19.6–45.3)
MCH RBC QN AUTO: 30.5 PG (ref 26.6–33)
MCHC RBC AUTO-ENTMCNC: 32.8 G/DL (ref 31.5–35.7)
MCV RBC AUTO: 93 FL (ref 79–97)
MONOCYTES # BLD AUTO: 0.44 10*3/MM3 (ref 0.1–0.9)
MONOCYTES NFR BLD AUTO: 8.5 % (ref 5–12)
NEUTROPHILS # BLD AUTO: 3.37 10*3/MM3 (ref 1.7–7)
NEUTROPHILS NFR BLD AUTO: 64.7 % (ref 42.7–76)
NRBC BLD AUTO-RTO: 0 /100 WBC (ref 0–0.2)
PLATELET # BLD AUTO: 133 10*3/MM3 (ref 140–450)
PMV BLD AUTO: 11.3 FL (ref 6–12)
POTASSIUM BLD-SCNC: 3.9 MMOL/L (ref 3.5–5.2)
PROT SERPL-MCNC: 7.1 G/DL (ref 6–8.5)
PROTHROMBIN TIME: 13.6 SECONDS (ref 11.7–14.2)
RBC # BLD AUTO: 4.56 10*6/MM3 (ref 4.14–5.8)
SODIUM BLD-SCNC: 140 MMOL/L (ref 136–145)
TROPONIN T SERPL-MCNC: <0.01 NG/ML (ref 0–0.03)
WBC NRBC COR # BLD: 5.2 10*3/MM3 (ref 3.4–10.8)

## 2019-05-02 PROCEDURE — 80053 COMPREHEN METABOLIC PANEL: CPT | Performed by: EMERGENCY MEDICINE

## 2019-05-02 PROCEDURE — 93005 ELECTROCARDIOGRAM TRACING: CPT | Performed by: EMERGENCY MEDICINE

## 2019-05-02 PROCEDURE — 85610 PROTHROMBIN TIME: CPT | Performed by: EMERGENCY MEDICINE

## 2019-05-02 PROCEDURE — 70450 CT HEAD/BRAIN W/O DYE: CPT

## 2019-05-02 PROCEDURE — 93010 ELECTROCARDIOGRAM REPORT: CPT | Performed by: INTERNAL MEDICINE

## 2019-05-02 PROCEDURE — 84484 ASSAY OF TROPONIN QUANT: CPT | Performed by: EMERGENCY MEDICINE

## 2019-05-02 PROCEDURE — 85025 COMPLETE CBC W/AUTO DIFF WBC: CPT | Performed by: EMERGENCY MEDICINE

## 2019-05-02 PROCEDURE — 99284 EMERGENCY DEPT VISIT MOD MDM: CPT

## 2019-05-02 RX ORDER — ACETAMINOPHEN 325 MG/1
TABLET ORAL
COMMUNITY
End: 2020-08-17

## 2019-05-02 RX ORDER — SODIUM CHLORIDE 0.9 % (FLUSH) 0.9 %
10 SYRINGE (ML) INJECTION AS NEEDED
Status: DISCONTINUED | OUTPATIENT
Start: 2019-05-02 | End: 2019-05-02 | Stop reason: HOSPADM

## 2019-05-02 RX ORDER — LOSARTAN POTASSIUM 50 MG/1
100 TABLET ORAL DAILY
COMMUNITY
Start: 2018-11-05 | End: 2023-01-12 | Stop reason: DRUGHIGH

## 2019-05-02 RX ORDER — ASPIRIN 81 MG/1
81 TABLET, CHEWABLE ORAL
COMMUNITY
Start: 2017-10-13 | End: 2023-01-12

## 2019-05-02 RX ORDER — DIPHENHYDRAMINE HCL 25 MG
25 CAPSULE ORAL
COMMUNITY
End: 2020-08-17

## 2019-05-02 NOTE — ED PROVIDER NOTES
EMERGENCY DEPARTMENT ENCOUNTER    CHIEF COMPLAINT  Chief Complaint: Numbness   History given by: patient   History limited by: n/a   Room Number: 16/16  PMD: Harry Murrell MD      HPI:  Pt is a 78 y.o. male who presents complaining of acute on chronic numbness in his extremities. Pt states that for the past year, he has had numbness in his extremities, but for the past several days, he experienced increased numbness in the LUE, primarily from the elbow into the hand. He also reports mild chest pressure, which again states is chronic, but worse. He also complains of chronic SOA, which has been exacerbated for the past several days. He denies headache, neck pain, or any other sx.     Duration:  Chronic   Onset: gradual  Location: extremities, worse for a few days   Radiation: none  Quality: numbness  Intensity/Severity: moderate  Progression: worse for a few days   Associated Symptoms: chronic CP, Chronic SOA  Aggravating Factors: none  Alleviating Factors: none    PAST MEDICAL HISTORY  Active Ambulatory Problems     Diagnosis Date Noted   • S/P total knee replacement using cement 08/12/2016   • Arthritis of right knee 08/12/2016   • Chronic pain of right knee 11/14/2016   • Primary osteoarthritis of right knee 03/03/2017   • Arthritis of right knee 03/16/2017   • Status post total right knee replacement 03/29/2017   • Arthritis of right hip 07/10/2017     Resolved Ambulatory Problems     Diagnosis Date Noted   • No Resolved Ambulatory Problems     Past Medical History:   Diagnosis Date   • Anxiety and depression    • Arthritis    • Asthma    • Chronic chest pain    • DDD (degenerative disc disease), cervical    • GERD (gastroesophageal reflux disease)    • Headache    • Hiatal hernia    • History of kidney stones    • Hx of staphylococcal infection    • Hyperlipidemia    • Hypertension    • Lung nodules    • Neck and shoulder pain    • Numbness or tingling    • Open wound of arm    • PVD (peripheral vascular  disease) (CMS/[facility])        PAST SURGICAL HISTORY  Past Surgical History:   Procedure Laterality Date   • CERVICAL DISCECTOMY ANTERIOR     • ROTATOR CUFF REPAIR Bilateral    • TONSILLECTOMY     • TOTAL KNEE ARTHROPLASTY Left    • TOTAL KNEE ARTHROPLASTY Right 3/15/2017    Procedure: TOTAL KNEE ARTHROPLASTY WITH TORI NAVIGATION;  Surgeon: Lg Jaramillo MD;  Location: [facility] OR;  Service:        FAMILY HISTORY  Family History   Problem Relation Age of Onset   • Hypertension Other    • Heart disease Other    • Diabetes Other        SOCIAL HISTORY  Social History     Socioeconomic History   • Marital status:      Spouse name: Not on file   • Number of children: Not on file   • Years of education: Not on file   • Highest education level: Not on file   Tobacco Use   • Smoking status: Former Smoker     Last attempt to quit: 2001     Years since quittin.8   • Smokeless tobacco: Never Used   Substance and Sexual Activity   • Alcohol use: Yes     Comment: RARE USE   • Drug use: No       ALLERGIES  Patient has no known allergies.    REVIEW OF SYSTEMS  Review of Systems   Constitutional: Negative for activity change, appetite change and fever.   HENT: Negative for congestion and sore throat.    Eyes: Negative.    Respiratory: Positive for shortness of breath (chronic). Negative for cough.    Cardiovascular: Positive for chest pain (chronic). Negative for leg swelling.   Gastrointestinal: Negative for abdominal pain, diarrhea and vomiting.   Endocrine: Negative.    Genitourinary: Negative for decreased urine volume and dysuria.   Musculoskeletal: Negative for neck pain.   Skin: Negative for rash and wound.   Allergic/Immunologic: Negative.    Neurological: Positive for numbness (chronic). Negative for weakness and headaches.   Hematological: Negative.    Psychiatric/Behavioral: Negative.    All other systems reviewed and are negative.      PHYSICAL EXAM  ED Triage Vitals [19 0257]   Temp Heart Rate  Resp BP SpO2   97.4 °F (36.3 °C) 85 16 -- 95 %      Temp src Heart Rate Source Patient Position BP Location FiO2 (%)   Tympanic Monitor -- -- --       Physical Exam   Constitutional: He is oriented to person, place, and time. No distress.   HENT:   Head: Normocephalic and atraumatic.   Eyes: EOM are normal. Pupils are equal, round, and reactive to light.   Neck: Normal range of motion. Neck supple.   Cardiovascular: Normal rate, regular rhythm and normal heart sounds.   Pulmonary/Chest: Effort normal and breath sounds normal. No respiratory distress.   Abdominal: Soft. There is no tenderness. There is no rebound and no guarding.   Musculoskeletal: Normal range of motion. He exhibits no edema.   Neurological: He is alert and oriented to person, place, and time. He has normal sensation and normal strength.   Skin: Skin is warm and dry.   Psychiatric: Mood and affect normal.   Nursing note and vitals reviewed.      LAB RESULTS  Lab Results (last 24 hours)     Procedure Component Value Units Date/Time    CBC & Differential [[id number]] Collected:  05/02/19 0324    Specimen:  Blood Updated:  05/02/19 0430    Narrative:       The following orders were created for panel order CBC & Differential.  Procedure                               Abnormality         Status                     ---------                               -----------         ------                     CBC Auto Differential[[id number]]        Abnormal            Final result                 Please view results for these tests on the individual orders.    Comprehensive Metabolic Panel [[id number]]  (Abnormal) Collected:  05/02/19 0324    Specimen:  Blood Updated:  05/02/19 0446     Glucose 119 mg/dL      BUN 20 mg/dL      Creatinine 1.10 mg/dL      Sodium 140 mmol/L      Potassium 3.9 mmol/L      Chloride 107 mmol/L      CO2 21.1 mmol/L      Calcium 9.6 mg/dL      Total Protein 7.1 g/dL      Albumin 4.20 g/dL      ALT (SGPT) 26 U/L      AST (SGOT) 27 U/L       Alkaline Phosphatase 120 U/L      Total Bilirubin 0.4 mg/dL      eGFR Non African Amer 65 mL/min/1.73      Globulin 2.9 gm/dL      A/G Ratio 1.4 g/dL      BUN/Creatinine Ratio 18.2     Anion Gap 11.9 mmol/L     Narrative:       GFR Normal >60  Chronic Kidney Disease <60  Kidney Failure <15    Protime-INR [[id number]]  (Normal) Collected:  05/02/19 0324    Specimen:  Blood Updated:  05/02/19 0434     Protime 13.6 Seconds      INR 1.06    Troponin [[id number]]  (Normal) Collected:  05/02/19 0324    Specimen:  Blood Updated:  05/02/19 0446     Troponin T <0.010 ng/mL     Narrative:       Troponin T Reference Range:  <= 0.03 ng/mL-   Negative for AMI  >0.03 ng/mL-     Abnormal for myocardial necrosis.  Clinicians would have to utilize clinical acumen, EKG, Troponin and serial changes to determine if it is an Acute Myocardial Infarction or myocardial injury due to an underlying chronic condition.     CBC Auto Differential [[id number]]  (Abnormal) Collected:  05/02/19 0324    Specimen:  Blood Updated:  05/02/19 0430     WBC 5.20 10*3/mm3      RBC 4.56 10*6/mm3      Hemoglobin 13.9 g/dL      Hematocrit 42.4 %      MCV 93.0 fL      MCH 30.5 pg      MCHC 32.8 g/dL      RDW 13.8 %      RDW-SD 46.8 fl      MPV 11.3 fL      Platelets 133 10*3/mm3      Neutrophil % 64.7 %      Lymphocyte % 23.3 %      Monocyte % 8.5 %      Eosinophil % 2.7 %      Basophil % 0.6 %      Immature Grans % 0.2 %      Neutrophils, Absolute 3.37 10*3/mm3      Lymphocytes, Absolute 1.21 10*3/mm3      Monocytes, Absolute 0.44 10*3/mm3      Eosinophils, Absolute 0.14 10*3/mm3      Basophils, Absolute 0.03 10*3/mm3      Immature Grans, Absolute 0.01 10*3/mm3      nRBC 0.0 /100 WBC           I ordered the above labs and reviewed the results    RADIOLOGY  CT Head Without Contrast   Final Result   1. No acute intracranial abnormality.                       This report was finalized on 5/2/2019 3:42 AM by Luis Enrique Goldstein M.D.               I ordered the above  noted radiological studies. Interpreted by radiologist. Reviewed by me in PACS.       PROCEDURES  Procedures  EKG          EKG time: 03:47  Rhythm/Rate: NSR 56  P waves and MT: nml  QRS, axis: nml   ST and T waves: nml     Interpreted Contemporaneously by me, independently viewed  unchanged compared to prior 3/2/14    PROGRESS AND CONSULTS       03;12  BP- (!) 181/97 HR- 85 Temp- 97.4 °F (36.3 °C) (Tympanic) O2 sat- 95%  Informed pt of the plan for labs and CT head. Pt understands and agrees with the plan, all questions answered.    03:16  EKG, CMP, CBC, PT/INR, troponin, and CT head.     04:58  BP- 166/86 HR- 65 Temp- 97.4 °F (36.3 °C) (Tympanic) O2 sat- 97%  Rechecked the patient who is in NAD and is resting comfortably. Informed pt that the workup in the ED shows NAD. Informed him of the plan for discharge with referral to neurology for f/u. Pt understands and agrees with the plan, all questions answered.    MEDICAL DECISION MAKING  Results were reviewed/discussed with the patient and they were also made aware of online access. Pt also made aware that some labs, such as cultures, will not be resulted during ER visit and follow up with PMD is necessary.     MDM  Number of Diagnoses or Management Options  Memory loss or impairment:   Paresthesia:      Amount and/or Complexity of Data Reviewed  Clinical lab tests: ordered and reviewed (Troponin is <0.010)  Tests in the radiology section of CPT®: reviewed and ordered (CT head shows NAD)  Tests in the medicine section of CPT®: ordered and reviewed (See EKG procedure note. )           DIAGNOSIS  Final diagnoses:   Paresthesia   Memory loss or impairment       DISPOSITION  DISCHARGE    Patient discharged in stable condition.    Reviewed implications of results, diagnosis, meds, responsibility to follow up, warning signs and symptoms of possible worsening, potential complications and reasons to return to ER.    Patient/Family voiced understanding of above  instructions.    Discussed plan for discharge, as there is no emergent indication for admission. Patient referred to primary care provider for BP management due to today's BP. Pt/family is agreeable and understands need for follow up and repeat testing.  Pt is aware that discharge does not mean that nothing is wrong but it indicates no emergency is present that requires admission and they must continue care with follow-up as given below or physician of their choice.     FOLLOW-UP  Harry Murrell MD  [address] [id number] [phone]          [facility] NEUROLOGY  [address]  [place] [address] [phone]  Call            Medication List      Stop    atorvastatin 20 MG tablet  Commonly known as:  LIPITOR     methocarbamol 750 MG tablet  Commonly known as:  ROBAXIN     oxyCODONE-acetaminophen 7.5-325 MG per tablet  Commonly known as:  PERCOCET     Vortioxetine HBr 5 MG tablet          Latest Documented Vital Signs:  As of 4:57 AM  BP- 166/86 HR- 65 Temp- 97.4 °F (36.3 °C) (Tympanic) O2 sat- 97%    --  Documentation assistance provided by bell Willoughby for Dr Villafuerte.  Information recorded by the scribe was done at my direction and has been verified and validated by me.        Linn Willoughby  05/02/19 [id number]       Avila Villafuerte MD  05/02/19 [id number]

## 2020-08-17 ENCOUNTER — HOSPITAL ENCOUNTER (EMERGENCY)
Facility: HOSPITAL | Age: 80
Discharge: HOME | End: 2020-08-17
Attending: EMERGENCY MEDICINE | Admitting: EMERGENCY MEDICINE

## 2020-08-17 ENCOUNTER — APPOINTMENT (OUTPATIENT)
Dept: CT IMAGING | Facility: HOSPITAL | Age: 80
End: 2020-08-17

## 2020-08-17 ENCOUNTER — APPOINTMENT (OUTPATIENT)
Dept: GENERAL RADIOLOGY | Facility: HOSPITAL | Age: 80
End: 2020-08-17

## 2020-08-17 VITALS
TEMPERATURE: 99 F | WEIGHT: 195.8 LBS | DIASTOLIC BLOOD PRESSURE: 76 MMHG | BODY MASS INDEX: 29.67 KG/M2 | HEART RATE: 77 BPM | OXYGEN SATURATION: 97 % | HEIGHT: 68 IN | RESPIRATION RATE: 17 BRPM | SYSTOLIC BLOOD PRESSURE: 159 MMHG

## 2020-08-17 DIAGNOSIS — C34.11 MALIGNANT NEOPLASM OF UPPER LOBE OF RIGHT LUNG (HCC): ICD-10-CM

## 2020-08-17 DIAGNOSIS — R53.1 WEAKNESS GENERALIZED: Primary | ICD-10-CM

## 2020-08-17 DIAGNOSIS — R63.0 ANOREXIA: ICD-10-CM

## 2020-08-17 LAB
ALBUMIN SERPL-MCNC: 4.1 G/DL (ref 3.5–5.2)
ALBUMIN/GLOB SERPL: 1.5 G/DL
ALP SERPL-CCNC: 97 U/L (ref 39–117)
ALT SERPL W P-5'-P-CCNC: 43 U/L (ref 1–41)
ANION GAP SERPL CALCULATED.3IONS-SCNC: 12.2 MMOL/L (ref 5–15)
AST SERPL-CCNC: 25 U/L (ref 1–40)
B PARAPERT DNA SPEC QL NAA+PROBE: NOT DETECTED
B PERT DNA SPEC QL NAA+PROBE: NOT DETECTED
BASOPHILS # BLD AUTO: 0.03 10*3/MM3 (ref 0–0.2)
BASOPHILS NFR BLD AUTO: 0.6 % (ref 0–1.5)
BILIRUB SERPL-MCNC: 0.4 MG/DL (ref 0–1.2)
BILIRUB UR QL STRIP: NEGATIVE
BUN SERPL-MCNC: 18 MG/DL (ref 8–23)
BUN/CREAT SERPL: 14.1 (ref 7–25)
C PNEUM DNA NPH QL NAA+NON-PROBE: NOT DETECTED
CALCIUM SPEC-SCNC: 9.4 MG/DL (ref 8.6–10.5)
CHLORIDE SERPL-SCNC: 104 MMOL/L (ref 98–107)
CLARITY UR: CLEAR
CO2 SERPL-SCNC: 23.8 MMOL/L (ref 22–29)
COLOR UR: YELLOW
CREAT SERPL-MCNC: 1.28 MG/DL (ref 0.76–1.27)
D DIMER PPP FEU-MCNC: 0.89 MCGFEU/ML (ref 0–0.49)
D-LACTATE SERPL-SCNC: 1.5 MMOL/L (ref 0.5–2)
DEPRECATED RDW RBC AUTO: 45.7 FL (ref 37–54)
EOSINOPHIL # BLD AUTO: 0.02 10*3/MM3 (ref 0–0.4)
EOSINOPHIL NFR BLD AUTO: 0.4 % (ref 0.3–6.2)
ERYTHROCYTE [DISTWIDTH] IN BLOOD BY AUTOMATED COUNT: 13.3 % (ref 12.3–15.4)
FLUAV H1 2009 PAND RNA NPH QL NAA+PROBE: NOT DETECTED
FLUAV H1 HA GENE NPH QL NAA+PROBE: NOT DETECTED
FLUAV H3 RNA NPH QL NAA+PROBE: NOT DETECTED
FLUAV SUBTYP SPEC NAA+PROBE: NOT DETECTED
FLUBV RNA ISLT QL NAA+PROBE: NOT DETECTED
GFR SERPL CREATININE-BSD FRML MDRD: 54 ML/MIN/1.73
GLOBULIN UR ELPH-MCNC: 2.7 GM/DL
GLUCOSE SERPL-MCNC: 181 MG/DL (ref 65–99)
GLUCOSE UR STRIP-MCNC: NEGATIVE MG/DL
HADV DNA SPEC NAA+PROBE: NOT DETECTED
HCOV 229E RNA SPEC QL NAA+PROBE: NOT DETECTED
HCOV HKU1 RNA SPEC QL NAA+PROBE: NOT DETECTED
HCOV NL63 RNA SPEC QL NAA+PROBE: NOT DETECTED
HCOV OC43 RNA SPEC QL NAA+PROBE: NOT DETECTED
HCT VFR BLD AUTO: 36.4 % (ref 37.5–51)
HGB BLD-MCNC: 12.2 G/DL (ref 13–17.7)
HGB UR QL STRIP.AUTO: NEGATIVE
HMPV RNA NPH QL NAA+NON-PROBE: NOT DETECTED
HPIV1 RNA SPEC QL NAA+PROBE: NOT DETECTED
HPIV2 RNA SPEC QL NAA+PROBE: NOT DETECTED
HPIV3 RNA NPH QL NAA+PROBE: NOT DETECTED
HPIV4 P GENE NPH QL NAA+PROBE: NOT DETECTED
IMM GRANULOCYTES # BLD AUTO: 0.04 10*3/MM3 (ref 0–0.05)
IMM GRANULOCYTES NFR BLD AUTO: 0.7 % (ref 0–0.5)
INR PPP: 1.09 (ref 0.9–1.1)
KETONES UR QL STRIP: NEGATIVE
LEUKOCYTE ESTERASE UR QL STRIP.AUTO: NEGATIVE
LYMPHOCYTES # BLD AUTO: 0.71 10*3/MM3 (ref 0.7–3.1)
LYMPHOCYTES NFR BLD AUTO: 13.1 % (ref 19.6–45.3)
M PNEUMO IGG SER IA-ACNC: NOT DETECTED
MAGNESIUM SERPL-MCNC: 2.1 MG/DL (ref 1.6–2.4)
MCH RBC QN AUTO: 31.5 PG (ref 26.6–33)
MCHC RBC AUTO-ENTMCNC: 33.5 G/DL (ref 31.5–35.7)
MCV RBC AUTO: 94.1 FL (ref 79–97)
MONOCYTES # BLD AUTO: 0.29 10*3/MM3 (ref 0.1–0.9)
MONOCYTES NFR BLD AUTO: 5.4 % (ref 5–12)
NEUTROPHILS NFR BLD AUTO: 4.32 10*3/MM3 (ref 1.7–7)
NEUTROPHILS NFR BLD AUTO: 79.8 % (ref 42.7–76)
NITRITE UR QL STRIP: NEGATIVE
NRBC BLD AUTO-RTO: 0 /100 WBC (ref 0–0.2)
NT-PROBNP SERPL-MCNC: 284.4 PG/ML (ref 0–1800)
PH UR STRIP.AUTO: <=5 [PH] (ref 5–8)
PLATELET # BLD AUTO: 151 10*3/MM3 (ref 140–450)
PMV BLD AUTO: 10.9 FL (ref 6–12)
POTASSIUM SERPL-SCNC: 4 MMOL/L (ref 3.5–5.2)
PROCALCITONIN SERPL-MCNC: 0.05 NG/ML (ref 0–0.25)
PROT SERPL-MCNC: 6.8 G/DL (ref 6–8.5)
PROT UR QL STRIP: NEGATIVE
PROTHROMBIN TIME: 14 SECONDS (ref 11.7–14.2)
RBC # BLD AUTO: 3.87 10*6/MM3 (ref 4.14–5.8)
RHINOVIRUS RNA SPEC NAA+PROBE: NOT DETECTED
RSV RNA NPH QL NAA+NON-PROBE: NOT DETECTED
SARS-COV-2 RNA PNL SPEC NAA+PROBE: NOT DETECTED
SODIUM SERPL-SCNC: 140 MMOL/L (ref 136–145)
SP GR UR STRIP: 1.02 (ref 1–1.03)
TROPONIN T SERPL-MCNC: <0.01 NG/ML (ref 0–0.03)
UROBILINOGEN UR QL STRIP: NORMAL
WBC # BLD AUTO: 5.41 10*3/MM3 (ref 3.4–10.8)

## 2020-08-17 PROCEDURE — 85379 FIBRIN DEGRADATION QUANT: CPT | Performed by: EMERGENCY MEDICINE

## 2020-08-17 PROCEDURE — 81003 URINALYSIS AUTO W/O SCOPE: CPT | Performed by: EMERGENCY MEDICINE

## 2020-08-17 PROCEDURE — 83605 ASSAY OF LACTIC ACID: CPT | Performed by: EMERGENCY MEDICINE

## 2020-08-17 PROCEDURE — 83880 ASSAY OF NATRIURETIC PEPTIDE: CPT | Performed by: EMERGENCY MEDICINE

## 2020-08-17 PROCEDURE — 83735 ASSAY OF MAGNESIUM: CPT | Performed by: EMERGENCY MEDICINE

## 2020-08-17 PROCEDURE — 0 IOPAMIDOL PER 1 ML: Performed by: EMERGENCY MEDICINE

## 2020-08-17 PROCEDURE — 93010 ELECTROCARDIOGRAM REPORT: CPT | Performed by: INTERNAL MEDICINE

## 2020-08-17 PROCEDURE — 80053 COMPREHEN METABOLIC PANEL: CPT | Performed by: EMERGENCY MEDICINE

## 2020-08-17 PROCEDURE — 85610 PROTHROMBIN TIME: CPT | Performed by: EMERGENCY MEDICINE

## 2020-08-17 PROCEDURE — 71275 CT ANGIOGRAPHY CHEST: CPT

## 2020-08-17 PROCEDURE — 99284 EMERGENCY DEPT VISIT MOD MDM: CPT

## 2020-08-17 PROCEDURE — 84484 ASSAY OF TROPONIN QUANT: CPT | Performed by: EMERGENCY MEDICINE

## 2020-08-17 PROCEDURE — 87040 BLOOD CULTURE FOR BACTERIA: CPT | Performed by: EMERGENCY MEDICINE

## 2020-08-17 PROCEDURE — 71045 X-RAY EXAM CHEST 1 VIEW: CPT

## 2020-08-17 PROCEDURE — 93005 ELECTROCARDIOGRAM TRACING: CPT | Performed by: EMERGENCY MEDICINE

## 2020-08-17 PROCEDURE — 96360 HYDRATION IV INFUSION INIT: CPT

## 2020-08-17 PROCEDURE — 84145 PROCALCITONIN (PCT): CPT | Performed by: EMERGENCY MEDICINE

## 2020-08-17 PROCEDURE — 96361 HYDRATE IV INFUSION ADD-ON: CPT

## 2020-08-17 PROCEDURE — 0202U NFCT DS 22 TRGT SARS-COV-2: CPT | Performed by: EMERGENCY MEDICINE

## 2020-08-17 PROCEDURE — 85025 COMPLETE CBC W/AUTO DIFF WBC: CPT | Performed by: EMERGENCY MEDICINE

## 2020-08-17 RX ORDER — SODIUM CHLORIDE 0.9 % (FLUSH) 0.9 %
10 SYRINGE (ML) INJECTION AS NEEDED
Status: DISCONTINUED | OUTPATIENT
Start: 2020-08-17 | End: 2020-08-17 | Stop reason: HOSPADM

## 2020-08-17 RX ORDER — SODIUM CHLORIDE 9 MG/ML
125 INJECTION, SOLUTION INTRAVENOUS CONTINUOUS
Status: DISCONTINUED | OUTPATIENT
Start: 2020-08-17 | End: 2020-08-17 | Stop reason: HOSPADM

## 2020-08-17 RX ORDER — MONTELUKAST SODIUM 10 MG/1
10 TABLET ORAL DAILY
COMMUNITY
Start: 2020-01-02

## 2020-08-17 RX ORDER — AMLODIPINE BESYLATE 5 MG/1
5 TABLET ORAL EVERY MORNING
COMMUNITY
Start: 2020-06-18

## 2020-08-17 RX ADMIN — SODIUM CHLORIDE 500 ML: 9 INJECTION, SOLUTION INTRAVENOUS at 14:15

## 2020-08-17 RX ADMIN — IOPAMIDOL 100 ML: 755 INJECTION, SOLUTION INTRAVENOUS at 16:33

## 2020-08-17 RX ADMIN — SODIUM CHLORIDE 125 ML/HR: 9 INJECTION, SOLUTION INTRAVENOUS at 14:15

## 2020-08-17 NOTE — ED NOTES
"Pt sent over from [facility] for COVID rule out. States his granddaughter was diagnosed with COVID recently and he was exposed bout a week. Pt reports he's been having SOB, headache, intermittent abd pain, and fatigue for several days. Pt reports intermittent midsternal chest discomfort/pressure for \"probably a year\". Currently rating it 3/10. States it feels the same as it has for the past year. Pt also diagnosed with R lung cancer and has received radiation treatment with the last one being in July 2020. Denies n/v/d and cough. Reports a fever of 99.9 on 8/13.     RN wearing mask, gown, face shield, hair and shoe covers and gloves throughout duration of care. Pt wearing mask.          Kortney Cotton, RN  08/17/20 1340    "

## 2020-08-17 NOTE — ED NOTES
"Pt comes to ER from [facility] for rule out COVID. Pt states he has been having SOA and midsternal \"chest discomfort\" x2 days. Pt states that he was exposed to granddaughter last Tuesday who was positive for COVID. Got swabbed at [facility] but does not have results yet. Pt and RN wearing mask upon triage.        Hawa Campbell, RN  08/17/20 1312    "

## 2020-08-17 NOTE — ED PROVIDER NOTES
" EMERGENCY DEPARTMENT ENCOUNTER    Room Number:  29/29  Date of encounter:  8/17/2020  PCP: Harry Murrell MD  Historian: Patient      HPI:  Chief Complaint: \"Feeling worse\"  A complete HPI/ROS/PMH/PSH/SH/FH are unobtainable due to: Patient is an elderly male in a very vague historian is very hard to pin him down on specifics.  Context: Sundeep Mason is a 79 y.o. male who presents to the ED c/o feeling worse over the past several days.  Patient has a history of lung cancer in which he is receiving radiation therapy for from [facility].  His last radiation was July 2, 2020.  He is kind of felt bad for several months.  He feels like he is just feeling worse as he feels that he is more fatigued and decreased energy level.  He has had a mild cough which is not changed over the past several months.  He had a temperature of 99.9 approximately 3 days ago.  His granddaughter who he was around tested positive for COVID.  He was around his granddaughter approximately 1 week ago.  He has been eating well.  He has had no nausea or vomiting.  He has had some episodic midepigastric pain that is been going on for several month as well.  At this time he does not have any abdominal pain.  He reports no chest pain.  He reports that he feels that his shortness of breath is gotten worse.  Again he has been short of breath for several years since he was diagnosed with lung cancer approximately 3 to 4 years ago.  He denies any history of any heart problems.  He is not any blood thinning medicine.  He denies any burning with urination or frequent urination.  He denies any focal motor or sensory changes to extremities.  He has had occasional headaches but that is not changed and has been going on for several months as well.  Those headaches come and go.  They are not abrupt in onset or severe in onset..  It appears as if he is just feeling worse over these past several days.  COVID infection is a possibility with a " recent exposure.  He denies any change in taste or smell.  Denies any sore throat.  He also denies any vomiting or diarrhea.        Previous Episodes: Yes that is longstanding and just feel little bit worse the past few days  Current Symptoms: See above    MEDICAL HISTORY REVIEWED  No previous cardiac work-up in my epic system at [facility].  No imaging other than remote distant x-rays of extremity and chest.      PAST MEDICAL HISTORY  Active Ambulatory Problems     Diagnosis Date Noted   • S/P total knee replacement using cement 08/12/2016   • Arthritis of right knee 08/12/2016   • Chronic pain of right knee 11/14/2016   • Primary osteoarthritis of right knee 03/03/2017   • Arthritis of right knee 03/16/2017   • Status post total right knee replacement 03/29/2017   • Arthritis of right hip 07/10/2017     Resolved Ambulatory Problems     Diagnosis Date Noted   • No Resolved Ambulatory Problems     Past Medical History:   Diagnosis Date   • Anxiety and depression    • Arthritis    • Asthma    • Chronic chest pain    • DDD (degenerative disc disease), cervical    • GERD (gastroesophageal reflux disease)    • Headache    • Hiatal hernia    • History of kidney stones    • Hx of staphylococcal infection    • Hyperlipidemia    • Hypertension    • Lung nodules    • Neck and shoulder pain    • Numbness or tingling    • Open wound of arm    • PVD (peripheral vascular disease) (CMS/HCC)          PAST SURGICAL HISTORY  Past Surgical History:   Procedure Laterality Date   • CERVICAL DISCECTOMY ANTERIOR     • ROTATOR CUFF REPAIR Bilateral    • TONSILLECTOMY     • TOTAL KNEE ARTHROPLASTY Left    • TOTAL KNEE ARTHROPLASTY Right 3/15/2017    Procedure: TOTAL KNEE ARTHROPLASTY WITH TORI NAVIGATION;  Surgeon: Lg Jaramillo MD;  Location: [facility] OR;  Service:          FAMILY HISTORY  Family History   Problem Relation Age of Onset   • Hypertension Other    • Heart disease Other    • Diabetes Other          SOCIAL  HISTORY  Social History     Socioeconomic History   • Marital status:      Spouse name: Not on file   • Number of children: Not on file   • Years of education: Not on file   • Highest education level: Not on file   Tobacco Use   • Smoking status: Former Smoker     Last attempt to quit: 2001     Years since quittin.1   • Smokeless tobacco: Never Used   Substance and Sexual Activity   • Alcohol use: Yes     Comment: RARE USE   • Drug use: No         ALLERGIES  Iodides        REVIEW OF SYSTEMS  Review of Systems     All systems reviewed and negative except for those discussed in HPI.       PHYSICAL EXAM    I have reviewed the triage vital signs and nursing notes.    ED Triage Vitals   Temp Heart Rate Resp BP SpO2   20 1308 20 1308 20 1308 20 1318 20 1308   99 °F (37.2 °C) 107 18 170/82 95 %      Temp src Heart Rate Source Patient Position BP Location FiO2 (%)   -- -- 20 1318 20 1318 --     Lying Right arm        GENERAL: Elderly male no acute distress.Vital signs on my initial evaluation O2 sat is 97% on room air.  Heart rate is normal on my exam  HENT: nares patent  Head/neck/ face are symmetric without gross deformity, signs of trauma, or swelling  EYES: no scleral icterus, no conjunctival pallor.  NECK: Supple, no meningismus  CV: regular rhythm, regular rate with intact distal pulses.  No murmur or rub  RESPIRATORY: normal effort and no respiratory distress.  Clear to auscultation bilaterally.  He does not have any coughs on my exam.  ABDOMEN: soft and non-tender.  MUSCULOSKELETAL: no deformity.  No edema.  Full range of motion with no pain with movement to his upper and lower extremities.  Intact distal pulses.  NEURO: alert and appropriate, moves all extremities, follows commands.  Alert and oriented x3.  No focal motor or sensory changes.  SKIN: warm, dry    Vital signs and nursing notes reviewed.        LAB RESULTS  Recent Results (from the past 24  hour(s))   Respiratory Panel PCR w/COVID-19(SARS-CoV-2) AUGUSTO/DYLLAN/KELLIE/PAD In-House, NP Swab in UTM/VTM, 3-4 HR TAT - Swab, Nasopharynx    Collection Time: 08/17/20  1:55 PM   Result Value Ref Range    ADENOVIRUS, PCR Not Detected Not Detected    Coronavirus 229E Not Detected Not Detected    Coronavirus HKU1 Not Detected Not Detected    Coronavirus NL63 Not Detected Not Detected    Coronavirus OC43 Not Detected Not Detected    COVID19 Not Detected Not Detected - Ref. Range    Human Metapneumovirus Not Detected Not Detected    Human Rhinovirus/Enterovirus Not Detected Not Detected    Influenza A PCR Not Detected Not Detected    Influenza A H1 Not Detected Not Detected    Influenza A H1 2009 PCR Not Detected Not Detected    Influenza A H3 Not Detected Not Detected    Influenza B PCR Not Detected Not Detected    Parainfluenza Virus 1 Not Detected Not Detected    Parainfluenza Virus 2 Not Detected Not Detected    Parainfluenza Virus 3 Not Detected Not Detected    Parainfluenza Virus 4 Not Detected Not Detected    RSV, PCR Not Detected Not Detected    Bordetella pertussis pcr Not Detected Not Detected    Bordetella parapertussis PCR Not Detected Not Detected    Chlamydophila pneumoniae PCR Not Detected Not Detected    Mycoplasma pneumo by PCR Not Detected Not Detected   Comprehensive Metabolic Panel    Collection Time: 08/17/20  1:56 PM   Result Value Ref Range    Glucose 181 (H) 65 - 99 mg/dL    BUN 18 8 - 23 mg/dL    Creatinine 1.28 (H) 0.76 - 1.27 mg/dL    Sodium 140 136 - 145 mmol/L    Potassium 4.0 3.5 - 5.2 mmol/L    Chloride 104 98 - 107 mmol/L    CO2 23.8 22.0 - 29.0 mmol/L    Calcium 9.4 8.6 - 10.5 mg/dL    Total Protein 6.8 6.0 - 8.5 g/dL    Albumin 4.10 3.50 - 5.20 g/dL    ALT (SGPT) 43 (H) 1 - 41 U/L    AST (SGOT) 25 1 - 40 U/L    Alkaline Phosphatase 97 39 - 117 U/L    Total Bilirubin 0.4 0.0 - 1.2 mg/dL    eGFR Non African Amer 54 (L) >60 mL/min/1.73    Globulin 2.7 gm/dL    A/G Ratio 1.5 g/dL     BUN/Creatinine Ratio 14.1 7.0 - 25.0    Anion Gap 12.2 5.0 - 15.0 mmol/L   BNP    Collection Time: 08/17/20  1:56 PM   Result Value Ref Range    proBNP 284.4 0.0-1,800.0 pg/mL   Troponin    Collection Time: 08/17/20  1:56 PM   Result Value Ref Range    Troponin T <0.010 0.000 - 0.030 ng/mL   D-dimer, Quantitative    Collection Time: 08/17/20  1:56 PM   Result Value Ref Range    D-Dimer, Quantitative 0.89 (H) 0.00 - 0.49 MCGFEU/mL   Magnesium    Collection Time: 08/17/20  1:56 PM   Result Value Ref Range    Magnesium 2.1 1.6 - 2.4 mg/dL   Protime-INR    Collection Time: 08/17/20  1:56 PM   Result Value Ref Range    Protime 14.0 11.7 - 14.2 Seconds    INR 1.09 0.90 - 1.10   Procalcitonin    Collection Time: 08/17/20  1:56 PM   Result Value Ref Range    Procalcitonin 0.05 0.00 - 0.25 ng/mL   CBC Auto Differential    Collection Time: 08/17/20  1:56 PM   Result Value Ref Range    WBC 5.41 3.40 - 10.80 10*3/mm3    RBC 3.87 (L) 4.14 - 5.80 10*6/mm3    Hemoglobin 12.2 (L) 13.0 - 17.7 g/dL    Hematocrit 36.4 (L) 37.5 - 51.0 %    MCV 94.1 79.0 - 97.0 fL    MCH 31.5 26.6 - 33.0 pg    MCHC 33.5 31.5 - 35.7 g/dL    RDW 13.3 12.3 - 15.4 %    RDW-SD 45.7 37.0 - 54.0 fl    MPV 10.9 6.0 - 12.0 fL    Platelets 151 140 - 450 10*3/mm3    Neutrophil % 79.8 (H) 42.7 - 76.0 %    Lymphocyte % 13.1 (L) 19.6 - 45.3 %    Monocyte % 5.4 5.0 - 12.0 %    Eosinophil % 0.4 0.3 - 6.2 %    Basophil % 0.6 0.0 - 1.5 %    Immature Grans % 0.7 (H) 0.0 - 0.5 %    Neutrophils, Absolute 4.32 1.70 - 7.00 10*3/mm3    Lymphocytes, Absolute 0.71 0.70 - 3.10 10*3/mm3    Monocytes, Absolute 0.29 0.10 - 0.90 10*3/mm3    Eosinophils, Absolute 0.02 0.00 - 0.40 10*3/mm3    Basophils, Absolute 0.03 0.00 - 0.20 10*3/mm3    Immature Grans, Absolute 0.04 0.00 - 0.05 10*3/mm3    nRBC 0.0 0.0 - 0.2 /100 WBC   Lactic Acid, Plasma    Collection Time: 08/17/20  2:11 PM   Result Value Ref Range    Lactate 1.5 0.5 - 2.0 mmol/L   Urinalysis With Microscopic If Indicated (No  Culture) - Urine, Clean Catch    Collection Time: 08/17/20  2:15 PM   Result Value Ref Range    Color, UA Yellow Yellow, Straw    Appearance, UA Clear Clear    pH, UA <=5.0 5.0 - 8.0    Specific Gravity, UA 1.017 1.005 - 1.030    Glucose, UA Negative Negative    Ketones, UA Negative Negative    Bilirubin, UA Negative Negative    Blood, UA Negative Negative    Protein, UA Negative Negative    Leuk Esterase, UA Negative Negative    Nitrite, UA Negative Negative    Urobilinogen, UA 0.2 E.U./dL 0.2 - 1.0 E.U./dL       Ordered the above labs and independently reviewed the results.        RADIOLOGY  Xr Chest 1 View    Result Date: 8/17/2020  CHEST:  HISTORY: Positive Covid 19 test. Fever and shortness of breath. Lung cancer.  FINDINGS: The lungs are well-expanded and clear except for a few scattered calcified granulomas. The heart and hilar structures are normal. There is no evidence of pneumonia or change from 10/17/2015.      Ct Angiogram Chest    Result Date: 8/17/2020  CT ANGIOGRAM OF THE CHEST. MULTIPLE CORONAL, SAGITTAL, AND 3-D RECONSTRUCTIONS.  HISTORY: 79-year-old male with weakness. Currently undergoing radiation for right upper lobe lung cancer.  TECHNIQUE: Radiation dose reduction techniques were utilized, including automated exposure control and exposure modulation based on body size. CT angiogram of the chest was performed following the administration of IV contrast. Multiple coronal, sagittal, and 3-D reconstruction images were obtained. There is no previous chest CT for comparison.  FINDINGS: There is adequate opacification of the pulmonary arteries and there is no evidence for pulmonary thromboemboli. There is an irregular mixed density approximately 2 cm opacity at the right upper lobe, image 65. There are very mild dependent atelectatic changes. There are no pleural or pericardial effusions. There is no lymphadenopathy within the chest. There are moderately advanced emphysematous changes and the lungs  are hyperinflated. There are moderately extensive thoracic aortic atherosclerotic changes with 3.8 cm ectasia of the ascending thoracic aorta and 3.3 cm ectasia of the aortic arch.      1. There is no evidence for pulmonary thromboemboli. 2. Approximately 2 cm mixed density irregular right upper lobe opacity likely represents the primary lung malignancy. There are no effusions or lymphadenopathy. Follow-up is recommended.  Discussed with Dr. Friedman.        I ordered the above noted radiological studies. Reviewed by me and discussed with radiologist.  See dictation for official radiology interpretation.      PROCEDURES    Procedures      MEDICATIONS GIVEN IN ER    Medications   sodium chloride 0.9 % flush 10 mL (has no administration in time range)   sodium chloride 0.9 % infusion (125 mL/hr Intravenous New Bag 8/17/20 1415)   sodium chloride 0.9 % bolus 500 mL (500 mL Intravenous New Bag 8/17/20 1415)   iopamidol (ISOVUE-370) 76 % injection 100 mL (100 mL Intravenous Given by Other 8/17/20 1633)         PROGRESS, DATA ANALYSIS, CONSULTS, AND MEDICAL DECISION MAKING    Patient symptoms are very vague and he is just feeling worse.  It is very possible this is a covert infection.  He does have lung cancer in which he is getting radiation treatment for.  He states that he is never received chemotherapy.  That could be a significant factor is a could be progression of his lung cancer.  He gets his treatment and his diagnosis was a [facility] cancer center.  I do not have any records here from them.  All questions answered.  We are currently under a pandemic from the COVID19 infection.  The patient presented to the emergency department by ambulance or personal vehicle.  During current hospital restrictions no other visitors were present in the emergency department during my evaluation and treatment. I followed the current protocols required by Infection Control at [facility] in my evaluation and  treatment of the patient. The patient was wearing a face mask during my evaluation and throughout my encounter. During my whole encounter with this patient I used appropriate personal protective equipment.  This equipment consisted of eye protection, facemask, gown, and gloves.  I applied this equipment before entering the room.    All labs have been independently reviewed by me.  All radiology studies have been reviewed by me and discussed with radiologist dictating the report.   EKG's independently viewed and interpreted by me.  Discussion below represents my analysis of pertinent findings related to patient's condition, differential diagnosis, treatment plan and final disposition.      ED Course as of Aug 17 1736   Mon Aug 17, 2020   1350 EKG reading EKG was done at 1314Rate of 89It is normal sinus rhythmNarrow complexNormal axis no acute process appreciatedQT normalLooks similar to an EKG that was done on 5/2/2019.    [MM]   1522 Previous creatinine is 1.1 from the 5 months ago.  Says a gradual mild increase over the past year.   Creatinine(!): 1.28 [MM]   1523 Hemoglobin(!): 12.2 [MM]   1709 Patient CT scan of the chest was discussed with the radiologist Dr. Vidal.  Patient has advanced emphysema.  Patient has a density in the right upper lobe that is very likely malignancy.  There is no effusion, there is no adenopathy.  I have no old records here but the patient is currently being treated for lung cancer and on radiation therapy.  There is no signs of pneumonia and pulmonary embolism as mentioned previously.    [MM]   1710 I have a low index suspicion that the patient does have a pulmonary embolism.  Patient does have cancer and is getting radiation treatment.  But his complaints are very vague and 1 of those complaints is that he feels as if his shortness of breath is little bit worse.   D-Dimer, Quant(!): 0.89 [MM]   1730 I have seen and reevaluated this patient.  Patient is resting comfortably actually has  fallen asleep here in the emergency department.  His heart rate is normal and his blood pressure is normal.  Is remained normal here in the emergency department.  His O2 sat is 97% on room air currently and in no respiratory distress.  I informed him of the results of the CT scan.  He states that the area of cancer is in his right upper lobe.  His symptoms have been going on for quite a while and is been gradually getting worse.  He had that recent COVID exposure but his symptoms are not 100% convincing of a covert infection.  His COVID test here in respiratory panel is negative.  I think this patient is good to go home.  I gave him instructions to follow-up with his primary care doctor in 2 to 3 days.  I gave him strict instructions to return if he has any fever greater than 100 °F, worsening of symptoms, or any concerns.  Patient agrees with this plan.  It is very possible his increase fatigue decreased energy level, and decreased appetite are all related to a progression of his cancer.  His symptoms are very atypical for an acute cardiac or pulmonary or other emergent condition.    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    Reviewed implications of results, diagnosis, meds, responsibility to follow up, warning signs and symptoms of possible worsening, potential complications and reasons to return to ER, including worsening of symptoms, fever greater than 100 °F, chest pain, abdominal pain, vomiting or diarrhea, any concerns.  As we discussed make sure you follow-up with your primary care doctor as well as your oncologist this week..    Patient/Family voiced understanding of above  instructions.    Discussed plan for discharge, as there is no emergent indication for admission. Pt/family is agreeable and understands need for follow up and repeat testing.  Pt is aware that discharge does not mean that nothing is wrong but it indicates no emergency is present that requires admission and they must continue care with follow-up as given below or physician of their choice.     FOLLOW-UP  Harry Murrell MD  [id number] [id number] [phone]    In 3 days  Return if any new pain, fever,, If symptoms worsen, shortness of breath, any concerns         Medication List      No changes were made to your prescriptions during this visit.                  Avila Friedman MD  08/17/20 2933

## 2020-08-22 LAB
BACTERIA SPEC AEROBE CULT: NORMAL
BACTERIA SPEC AEROBE CULT: NORMAL

## 2022-12-02 ENCOUNTER — OFFICE VISIT (OUTPATIENT)
Dept: ORTHOPEDIC SURGERY | Facility: CLINIC | Age: 82
End: 2022-12-02

## 2022-12-02 VITALS — HEIGHT: 68 IN | WEIGHT: 195 LBS | TEMPERATURE: 97.6 F | BODY MASS INDEX: 29.55 KG/M2 | RESPIRATION RATE: 12 BRPM

## 2022-12-02 DIAGNOSIS — M25.512 CHRONIC LEFT SHOULDER PAIN: ICD-10-CM

## 2022-12-02 DIAGNOSIS — M19.019 ARTHRITIS OF SHOULDER: ICD-10-CM

## 2022-12-02 DIAGNOSIS — G89.29 CHRONIC LEFT SHOULDER PAIN: ICD-10-CM

## 2022-12-02 DIAGNOSIS — M25.511 CHRONIC RIGHT SHOULDER PAIN: Primary | ICD-10-CM

## 2022-12-02 DIAGNOSIS — G89.29 CHRONIC RIGHT SHOULDER PAIN: Primary | ICD-10-CM

## 2022-12-02 PROCEDURE — 73030 X-RAY EXAM OF SHOULDER: CPT | Performed by: ORTHOPAEDIC SURGERY

## 2022-12-02 PROCEDURE — 99204 OFFICE O/P NEW MOD 45 MIN: CPT | Performed by: ORTHOPAEDIC SURGERY

## 2022-12-02 PROCEDURE — 20610 DRAIN/INJ JOINT/BURSA W/O US: CPT | Performed by: ORTHOPAEDIC SURGERY

## 2022-12-02 RX ORDER — ACETAMINOPHEN 325 MG/1
1000 TABLET ORAL ONCE
Status: CANCELLED | OUTPATIENT
Start: 2023-01-26 | End: 2022-12-02

## 2022-12-02 RX ORDER — PREGABALIN 75 MG/1
150 CAPSULE ORAL ONCE
Status: CANCELLED | OUTPATIENT
Start: 2023-01-26 | End: 2022-12-02

## 2022-12-02 RX ORDER — CEFAZOLIN SODIUM 2 G/100ML
2 INJECTION, SOLUTION INTRAVENOUS ONCE
Status: CANCELLED | OUTPATIENT
Start: 2023-01-26 | End: 2022-12-02

## 2022-12-02 RX ORDER — MELOXICAM 15 MG/1
15 TABLET ORAL ONCE
Status: CANCELLED | OUTPATIENT
Start: 2023-01-26 | End: 2022-12-02

## 2022-12-02 RX ORDER — TRAMADOL HYDROCHLORIDE 50 MG/1
50 TABLET ORAL EVERY 4 HOURS PRN
Qty: 60 TABLET | Refills: 0 | Status: SHIPPED | OUTPATIENT
Start: 2022-12-02 | End: 2023-02-10

## 2022-12-02 RX ORDER — ATORVASTATIN CALCIUM 20 MG/1
20 TABLET, FILM COATED ORAL DAILY
COMMUNITY
Start: 2022-11-01

## 2022-12-02 RX ADMIN — METHYLPREDNISOLONE ACETATE 40 MG: 40 INJECTION, SUSPENSION INTRA-ARTICULAR; INTRALESIONAL; INTRAMUSCULAR; SOFT TISSUE at 14:14

## 2022-12-02 NOTE — PROGRESS NOTES
Patient: Sundeep Mason    YOB: 1940    Medical Record Number: [id number]    Chief Complaints:  Bilateral shoulder pain and weakness    History of Present Illness:     82 y.o. male patient who presents with a complaint of bilateral shoulder pain.  He has had bilateral rotator cuff repairs in the past.  The right was done maybe 20 years ago whereas the left was more recent per his report.  He has had worsening problems in both shoulders for years.  Both bother him but the left is the worst of the 2.  He has been treated by another physician.  He has tried activity modifications, anti-inflammatories, a topical cream, and injections in the past.  He says the injections did help but the effects were very transient.  He comes to me for second opinion wanting to discuss possible arthroplasty.    Allergies:   Allergies   Allergen Reactions   • Iodides Other (See Comments)     Tolerates betadine topical       Home Medications:    Current Outpatient Medications:   •  amLODIPine (NORVASC) 5 MG tablet, Take 1 tablet by mouth once daily, Disp: , Rfl:   •  aspirin 81 MG chewable tablet, Chew 81 mg., Disp: , Rfl:   •  atorvastatin (LIPITOR) 20 MG tablet, , Disp: , Rfl:   •  famotidine (PEPCID) 20 MG tablet, Take 20 mg by mouth 2 (two) times a day., Disp: , Rfl:   •  losartan (COZAAR) 50 MG tablet, TAKE 1 TABLET BY MOUTH EVERY DAY, Disp: , Rfl:   •  montelukast (SINGULAIR) 10 MG tablet, Take 10 mg by mouth Daily., Disp: , Rfl:   •  traZODone (DESYREL) 100 MG tablet, Take 100 mg by mouth Daily., Disp: , Rfl:     Past Medical History:   Diagnosis Date   • Anxiety and depression     RECENT LOSS OF HIS WIFE; PT STATED THAT HE FELT LIKE DYING EVERYDAY BUT ISN'T SUICIDAL; ENCOURAGED HIM TO TALK TO PCP ABOUT GETTING HELP.   • Arthritis    • Asthma    • Chronic chest pain    • DDD (degenerative disc disease), cervical    • GERD (gastroesophageal reflux disease)    • Headache    • Hiatal hernia    • History of kidney stones     • Hx of staphylococcal infection     AFTER LEFT TKA   • Hyperlipidemia    • Hypertension    • Lung nodules    • Neck and shoulder pain    • Numbness or tingling    • Open wound of arm     RIGHT ARM; HEALING   • PVD (peripheral vascular disease) (HCC)        Past Surgical History:   Procedure Laterality Date   • CERVICAL DISCECTOMY ANTERIOR     • ROTATOR CUFF REPAIR Bilateral    • TONSILLECTOMY     • TOTAL KNEE ARTHROPLASTY Left    • TOTAL KNEE ARTHROPLASTY Right 3/15/2017    Procedure: TOTAL KNEE ARTHROPLASTY WITH TORI NAVIGATION;  Surgeon: Lg Jaramillo MD;  Location: [facility] OR;  Service:        Social History     Occupational History   • Not on file   Tobacco Use   • Smoking status: Former     Types: Cigarettes     Quit date: 2001     Years since quittin.4   • Smokeless tobacco: Never   Vaping Use   • Vaping Use: Never used   Substance and Sexual Activity   • Alcohol use: Yes     Comment: RARE USE   • Drug use: No   • Sexual activity: Not on file      Social History     Social History Narrative   • Not on file       Family History   Problem Relation Age of Onset   • Hypertension Other    • Heart disease Other    • Diabetes Other        Review of Systems:      Constitutional: Denies fever, shaking or chills   Eyes: Denies change in visual acuity   HEENT: Denies nasal congestion or sore throat   Respiratory: Denies cough or shortness of breath   Cardiovascular: Denies chest pain or edema  Endocrine: Denies tremors, palpitations, intolerance of heat or cold, polyuria, polydipsia.  GI: Denies abdominal pain, nausea, vomiting, bloody stools or diarrhea  : Denies frequency, urgency, incontinence, retention, or nocturia.  Musculoskeletal: Denies numbness, tingling or loss of motor function except as above  Integument: Denies rash, lesion or ulceration   Neurologic: Denies headache or focal weakness, deficits  Heme: Denies spontaneous or excessive bleeding, epistaxis, hematuria, melena, fatigue,  "enlarged or tender lymph nodes.      All other pertinent positives and negatives as noted above in HPI.    Physical Exam:   82 y.o. male  Vitals:    12/02/22 1331   Resp: 12   Temp: 97.6 °F (36.4 °C)   Weight: 88.5 kg (195 lb)   Height: 172.7 cm (68\")     General:  Patient is awake and alert.  Appears in no acute distress or discomfort.    Psych:  Affect and demeanor are appropriate.    Eyes:  Conjunctiva and sclera appear grossly normal.  Eyes track well and EOM seem to be intact.    Ears:  No gross abnormalities.  Hearing adequate for the exam.    Cardiovascular:  Regular rate and rhythm.    Lungs:  Good chest expansion.  Breathing unlabored.    Spine:  Neck appears grossly normal.  No palpable masses or adenopathy.  Good motion.  Spurling's maneuver is negative for any shoulder or arm symptoms.    Extremities:  Left shoulder is examined.  Skin is benign.  No obvious gross abnormalities.  No palpable masses or adenopathy.  Moderate tenderness noted over anterior glenohumeral joint and rotator interval.  Motion is limited and uncomfortable.  150 degrees FE, 40 degrees ER, side to back pocket IR.  No instability.  4 out of 5 strength with resistive testing of elevation in the scapular plane and external rotation.  Good motor function in the lower arm and hand including wrist flexion, extension,  and pinch.  Intact sensation.  Palpable radial pulse.  Brisk capillary refill.  Good skin turgor.    Right shoulder is also examined.  Skin is benign.  Trace bursal effusion.  Mild tenderness anteriorly.  His motion is a little better: Forward elevation 155 degrees, external rotation 55 degrees, internal rotation to nearly his back pocket.  Then, he has weakness with forward elevation in the scapular plane and external rotation.  Intact motor and sensory function in his lower arm and hand.         Radiology:  AP, scapular Y, and axillary views of the left shoulder are ordered by myself and reviewed to evaluate the " patient's complaint.  No comparison films are immediately available.  The x-rays show significant rotator cuff tear arthropathy with a diminished acromiohumeral interval, joint space narrowing, osteophyte formation, and subchondral sclerosis.  He has a retained metal anchor in the humeral head consistent with his history of previous rotator cuff repair.    Assessment/Plan:  Bilateral shoulder rotator cuff tear arthropathy    We discussed treatment options in detail including the risks, benefits, and alternatives of conservative treatment versus surgical options.  He had a good understanding of this information having previously covered it with his other doctor.  He says he feels like it is time to pursue the replacement for the left shoulder.  Conservative treatment has failed and his symptoms seem to be getting progressively worse.  The risk, benefits and alternatives to a reverse arthroplasty were discussed with him in detail.  He acknowledged understanding.  I will have my  contact him about setting up the surgery.  He will follow-up with myself or Danya for a preoperative visit.  He did request something for the pain to get him through to the time of surgery.  I suggested an anti-inflammatory.  He says those have not worked very well for him in the past.  I agreed to give him a prescription for tramadol to take as needed.  Risks of this medicine were discussed.    I offered him an injection for the right shoulder today.  He did want to try that again just to get him some relief hopefully through the surgery for the left.  The risk, benefits and alternatives of the shot were discussed.  He consented and the injection was performed as described below.      Navarro Guido MD    12/02/2022    CC to Harry Murrell MD     Large Joint Arthrocentesis: R subacromial bursa  Date/Time: 12/2/2022 2:14 PM  Consent given by: patient  Site marked: site marked  Timeout: Immediately prior to procedure a time out  was called to verify the correct patient, procedure, equipment, support staff and site/side marked as required   Supporting Documentation  Indications: pain and joint swelling   Procedure Details  Location: shoulder - R subacromial bursa  Preparation: Patient was prepped and draped in the usual sterile fashion  Needle size: 25 G  Approach: posterior  Medications administered: 2 mL lidocaine (cardiac); 40 mg methylPREDNISolone acetate 40 MG/ML  Patient tolerance: patient tolerated the procedure well with no immediate complications

## 2022-12-05 RX ORDER — METHYLPREDNISOLONE ACETATE 40 MG/ML
40 INJECTION, SUSPENSION INTRA-ARTICULAR; INTRALESIONAL; INTRAMUSCULAR; SOFT TISSUE
Status: COMPLETED | OUTPATIENT
Start: 2022-12-02 | End: 2022-12-02

## 2022-12-06 PROBLEM — G89.29 CHRONIC RIGHT SHOULDER PAIN: Status: ACTIVE | Noted: 2022-12-06

## 2022-12-06 PROBLEM — M19.019 ARTHRITIS OF SHOULDER: Status: ACTIVE | Noted: 2022-12-06

## 2022-12-06 PROBLEM — M25.511 CHRONIC RIGHT SHOULDER PAIN: Status: ACTIVE | Noted: 2022-12-06

## 2023-01-12 ENCOUNTER — PRE-ADMISSION TESTING (OUTPATIENT)
Dept: PREADMISSION TESTING | Facility: HOSPITAL | Age: 83
End: 2023-01-12
Payer: MEDICARE

## 2023-01-12 VITALS
HEART RATE: 96 BPM | WEIGHT: 176.7 LBS | OXYGEN SATURATION: 97 % | SYSTOLIC BLOOD PRESSURE: 161 MMHG | BODY MASS INDEX: 26.78 KG/M2 | HEIGHT: 68 IN | DIASTOLIC BLOOD PRESSURE: 67 MMHG | TEMPERATURE: 97.7 F | RESPIRATION RATE: 16 BRPM

## 2023-01-12 LAB
ANION GAP SERPL CALCULATED.3IONS-SCNC: 11 MMOL/L (ref 5–15)
BUN SERPL-MCNC: 19 MG/DL (ref 8–23)
BUN/CREAT SERPL: 13.6 (ref 7–25)
CALCIUM SPEC-SCNC: 9.1 MG/DL (ref 8.6–10.5)
CHLORIDE SERPL-SCNC: 107 MMOL/L (ref 98–107)
CO2 SERPL-SCNC: 23 MMOL/L (ref 22–29)
CREAT SERPL-MCNC: 1.4 MG/DL (ref 0.76–1.27)
DEPRECATED RDW RBC AUTO: 46 FL (ref 37–54)
EGFRCR SERPLBLD CKD-EPI 2021: 50.2 ML/MIN/1.73
ERYTHROCYTE [DISTWIDTH] IN BLOOD BY AUTOMATED COUNT: 13.7 % (ref 12.3–15.4)
GLUCOSE SERPL-MCNC: 121 MG/DL (ref 65–99)
HCT VFR BLD AUTO: 35.7 % (ref 37.5–51)
HGB BLD-MCNC: 11.9 G/DL (ref 13–17.7)
MCH RBC QN AUTO: 30.9 PG (ref 26.6–33)
MCHC RBC AUTO-ENTMCNC: 33.3 G/DL (ref 31.5–35.7)
MCV RBC AUTO: 92.7 FL (ref 79–97)
PLATELET # BLD AUTO: 163 10*3/MM3 (ref 140–450)
PMV BLD AUTO: 11.2 FL (ref 6–12)
POTASSIUM SERPL-SCNC: 3.8 MMOL/L (ref 3.5–5.2)
QT INTERVAL: 367 MS
RBC # BLD AUTO: 3.85 10*6/MM3 (ref 4.14–5.8)
SODIUM SERPL-SCNC: 141 MMOL/L (ref 136–145)
WBC NRBC COR # BLD: 4.68 10*3/MM3 (ref 3.4–10.8)

## 2023-01-12 PROCEDURE — 93005 ELECTROCARDIOGRAM TRACING: CPT

## 2023-01-12 PROCEDURE — 85027 COMPLETE CBC AUTOMATED: CPT

## 2023-01-12 PROCEDURE — 93010 ELECTROCARDIOGRAM REPORT: CPT | Performed by: INTERNAL MEDICINE

## 2023-01-12 PROCEDURE — 36415 COLL VENOUS BLD VENIPUNCTURE: CPT

## 2023-01-12 PROCEDURE — 80048 BASIC METABOLIC PNL TOTAL CA: CPT

## 2023-01-12 RX ORDER — LOSARTAN POTASSIUM 100 MG/1
100 TABLET ORAL EVERY MORNING
COMMUNITY

## 2023-01-12 RX ORDER — ASPIRIN 81 MG/1
81 TABLET ORAL DAILY
COMMUNITY

## 2023-01-12 RX ORDER — HYDROCODONE BITARTRATE AND ACETAMINOPHEN 5; 325 MG/1; MG/1
TABLET ORAL
COMMUNITY
Start: 2023-01-03 | End: 2023-01-12

## 2023-01-12 RX ORDER — MECLIZINE HCL 12.5 MG/1
12.5 TABLET ORAL 3 TIMES DAILY PRN
COMMUNITY

## 2023-01-12 RX ORDER — PREDNISONE 20 MG/1
TABLET ORAL
COMMUNITY
Start: 2023-01-03 | End: 2023-01-12

## 2023-01-12 NOTE — DISCHARGE INSTRUCTIONS
Take the following medications the morning of surgery:  [facility] WILL CALL WITH ARRIVAL TIME FOR 1/26      If you are on prescription narcotic pain medication to control your pain you may also take that medication the morning of surgery.    General Instructions:  Do not eat solid food after midnight the night before surgery.  You may drink clear liquids day of surgery but must stop at least one hour before your hospital arrival time.  It is beneficial for you to have a clear drink that contains carbohydrates the day of surgery.  We suggest a 12 to 20 ounce bottle of Gatorade or Powerade for non-diabetic patients or a 12 to 20 ounce bottle of G2 or Powerade Zero for diabetic patients. (Pediatric patients, are not advised to drink a 12 to 20 ounce carbohydrate drink)    Clear liquids are liquids you can see through.  Nothing red in color.     Plain water                               Sports drinks  Sodas                                   Gelatin (Jell-O)  Fruit juices without pulp such as white grape juice and apple juice  Popsicles that contain no fruit or yogurt  Tea or coffee (no cream or milk added)  Gatorade / Powerade  G2 / Powerade Zero    Infants may have breast milk up to four hours before surgery.  Infants drinking formula may drink formula up to six hours before surgery.   Patients who avoid smoking, chewing tobacco and alcohol for 4 weeks prior to surgery have a reduced risk of post-operative complications.  Quit smoking as many days before surgery as you can.  Do not smoke, use chewing tobacco or drink alcohol the day of surgery.   If applicable bring your C-PAP/ BI-PAP machine.  Bring any papers given to you in the doctor’s office.  Wear clean comfortable clothes.  Do not wear contact lenses, false eyelashes or make-up.  Bring a case for your glasses.   Bring crutches or walker if applicable.  Remove all piercings.  Leave jewelry and any other valuables at home.  Hair extensions with metal  clips must be removed prior to surgery.  The Pre-Admission Testing nurse will instruct you to bring medications if unable to obtain an accurate list in Pre-Admission Testing.            Day of surgery:  Your arrival time is approximately two hours before your scheduled surgery time.  Upon arrival, a Pre-op nurse and Anesthesiologist will review your health history, obtain vital signs, and answer questions you may have.  The only belongings needed at this time will be a list of your home medications and if applicable your C-PAP/BI-PAP machine.  A Pre-op nurse will start an IV and you may receive medication in preparation for surgery, including something to help you relax.     Please be aware that surgery does come with discomfort.  We want to make every effort to control your discomfort so please discuss any uncontrolled symptoms with your nurse.   Your doctor will most likely have prescribed pain medications.      If you are going home after surgery you will receive individualized written care instructions before being discharged.  A responsible adult must drive you to and from the hospital on the day of your surgery and stay with you for 24 hours.  Discharge prescriptions can be filled by the hospital pharmacy during regular pharmacy hours.  If you are having surgery late in the day/evening your prescription may be e-prescribed to your pharmacy.  Please verify your pharmacy hours or chose a 24 hour pharmacy to avoid not having access to your prescription because your pharmacy has closed for the day.    If you are staying overnight following surgery, you will be transported to your hospital room following the recovery period.  [facility] has all private rooms.    If you have any questions please call Pre-Admission Testing at [phone].  Deductibles and co-payments are collected on the day of service. Please be prepared to pay the required co-pay, deductible or deposit on the day of service as  defined by your plan.    Call your surgeon immediately if you experience any of the following symptoms:  Sore Throat  Shortness of Breath or difficulty breathing  Cough  Chills  Body soreness or muscle pain  Headache  Fever  New loss of taste or smell  Do not arrive for your surgery ill.  Your procedure will need to be rescheduled to another time.  You will need to call your physician before the day of surgery to avoid any unnecessary exposure to hospital staff as well as other patients.        PREVENTING INFECTION IN SHOULDER SURGICAL SITES     C. acnes is a bacteria that lives deep within follicles and pores of the skin. It is found in large numbers on the skin of the face, axilla (armpit), chest and back and is the primary bacteria to cause a surgical site infection after shoulder surgery.      Use of a Benzoyl Peroxide solution prior to shoulder surgery decreases C. acnes and reduces post-op infections.   Your surgeon has ordered 5% Benzoyl Peroxide wash to be used three times prior to your surgery.     Please read the following instructions carefully and bring this form with you the day of surgery.     General bathing instructions starting two days before your surgery:    Shower using a fresh bar of anti-bacterial soap (such as Dial) and clean washcloth.  Pay special attention to the neck, shoulder and armpit area.   Wash your hair as usual with your regular shampoo.   Rinse hair and body thoroughly with warm water (not hot water) to remove shampoo and residue.   Dry with a clean towel.              Sleep in a clean bed with clean clothing.  Do not allow pets to sleep with you.     For 2 days before surgery, avoid shaving with a razor because the razor can irritate skin and make it easier to develop an infection.    Any areas of open skin can increase the risk of a post-operative wound infection by allowing bacteria to enter and travel throughout the body.  Notify your surgeon if you have any skin wounds /  rashes even if it is not near the expected surgical site.  The area will need assessed to determine if surgery should be delayed until it is healed.      First application of 5% Benzoyl Peroxide Wash two nights before surgery:                                                                Wash neck, shoulder (front, back, side) and armpit   with warm water, rinse and dry - see picture.  Gently wash the same areas with the Benzoyl Peroxide   cleanser going away from the neck for 10-20 seconds.   Work into a full lather and leave on the skin for   2 minutes for greatest effect.  Rinse thoroughly with warm water, not hot water.                     Pat dry with a clean towel.                                                            Wash your hands thoroughly.  Do not apply lotion, powder, perfume or deodorant.   Put on clean clothes.    Second application the night before surgery:  Repeat the above steps.        Third application morning of surgery:  Repeat the above steps.    Due to shoulder pain or decreased range of motion of your shoulder and arm, you may need assistance washing under the arm or the back portion of your shoulder.     Avoid further washing the areas of the skin treated with Benzoyl Peroxide for at least 1 hour.    For your convenience, you may purchase Benzoyl Peroxide at [facility] retail pharmacy.     Warning:  Let your physician know if you are allergic to Benzoyl Peroxide or have very sensitive skin and cannot use it.   Stop using and contact your surgeon if you experience any excessive scaling, itching, swelling, skin irritation or other signs of a reaction.  Keep out of eyes, ears, nose and mouth.  Do not apply to sunburned, irritated or broken area on the skin.  Avoid unwanted problems with bleaching effect by following these tips:  Wash hands after each use.  Avoid contact with hair, clothing, furnishings or carpeting.  Wear clean, old t-shirt or clothing to bed.   Use clean, old white  pillow cases and sheets to avoid discoloring your bed linens.                                                                                                                                                                                                                                                                                   Please complete the checklist below, bring it with you to the hospital                               the day of surgery and give to the Pre-op Nurse     Preoperative Skin Prep Checklist        Patient Name Label             Enter dates and ?  boxes to indicate completed    Surgery Date: _______________ Regular Shower  Benzoyl Peroxide Wash   First Application:  2 days before_______________  (Stop shaving all body parts)           Morning or Evening                        Evening    Second Application:  1 day  before________________        Morning or Evening                          Evening   Third Application:  Day of Surgery______________                           Morning                   Morning

## 2023-01-16 ENCOUNTER — TELEPHONE (OUTPATIENT)
Dept: ORTHOPEDIC SURGERY | Facility: CLINIC | Age: 83
End: 2023-01-16

## 2023-01-16 NOTE — TELEPHONE ENCOUNTER
Hub staff attempted to follow warm transfer process and was unsuccessful    Caller: KELLY BELTRAN    Relationship to patient: SELF    Best call back number: [phone]    Patient is needing: PATIENT RUNNING LATE FOR PRE-OP APPT, HUB ATTEMPTED TO WARM TRANSFER BUT ULTIMATELY UNSUCCESSFUL. PLEASE CALL HIM BACK ASAP AT THE NUMBER ABOVE.

## 2023-01-19 ENCOUNTER — TELEPHONE (OUTPATIENT)
Dept: ORTHOPEDIC SURGERY | Facility: HOSPITAL | Age: 83
End: 2023-01-19
Payer: MEDICARE

## 2023-01-19 NOTE — TELEPHONE ENCOUNTER
Age >75 X    BMI <20 >40  X   Patient History     Chronic Pain (2 or more meds/Pain Management)  X   ETOH (more than 3 drinks Daily)  X   Uncontrolled Depression/Bipolar/Schizoaffective Disorder  X   Arrhythmias  X   Stent placement/MI  X   DVT/PE  X   Pacemaker  X   HTN (uncontrolled or requiring more than 2 medications)  X   CHF/Retained fluids/Edema  X   Stroke with Residual   X   COPD/Asthma X    NIKLOE--Non-compliant with CPAP  X   Diabetes (on insulin or more than 2 meds)         A1C:  X   BPH/Urinary retention (on medication)  X   CKD  X   Home environment and support     Current ambulation status (use of cane, walker, W/C, Multiple falls/weakness)  X   Stairs to enter and throughout home X    Lives Alone X    Doesn’t have support at home  X     Outpatient Screening Assessment    Home needs: (Walker/BSC):  Total Shoulder   ? Steps 2 step with rail to get in, everything else on one level.   Caregiver 24-48hrs post-discharge: Not sure, but he thinks that his Kids will be there to help him out     Discharge Plan:  Total Shoulder     Prescriptions: Meds to bed    Home medications:   ? Blood thinner/anti-coag therapy--ASA   ? BPH or diuretic  ? BP meds--Norvasc, Cozaar   ? Pain/Anti-inflammatories--Ultram   Pre-op Education:  Educate patient on spinal anesthesia/pain control:  ? patient verbalize understanding    Educate patient on hospital course/timeline:  ?  patient verbalize understanding    Joint Care Class:  ?  yes ? no    Notes:   Creatinine 1.40  He missed his pre op appointment with Danya he says he has another one tomorrow morning but I don’t see it listed in his appointments. Just wanted to make sure that he is taken care of.  Also, he said for the past week he has been battling a sore throat and congestion. He has a call out to his PCP. He said he did have a small fever on Tuesday (100.8) but since it has been normal. He denies any SOA. Just wanted to make you aware of what was going on.  Didn’t see a plan  in place for him so I gave him a call. He said he would be ok with going home after the surgery, but wanted to see what he felt like. If you want to keep him, let me know and I will switch it.

## 2023-01-20 ENCOUNTER — OFFICE VISIT (OUTPATIENT)
Dept: ORTHOPEDIC SURGERY | Facility: CLINIC | Age: 83
End: 2023-01-20
Payer: MEDICARE

## 2023-01-20 VITALS — WEIGHT: 172.7 LBS | HEIGHT: 68 IN | TEMPERATURE: 97.7 F | BODY MASS INDEX: 26.18 KG/M2

## 2023-01-20 DIAGNOSIS — Z01.818 PREOP EXAMINATION: Primary | ICD-10-CM

## 2023-01-20 PROCEDURE — S0260 H&P FOR SURGERY: HCPCS | Performed by: NURSE PRACTITIONER

## 2023-01-20 NOTE — PROGRESS NOTES
"   History & Physical         Patient: Sundeep Mason    YOB: 1940    Medical Record Number: [id number]    Chief Complaints:   Chief Complaint   Patient presents with   • Left Shoulder - Pre-op Exam, Pain       History of Present Illness: 82 y.o. male comes in today for upcoming shoulder replacement surgery. Reports a long history of progressively worsening pain, motion loss, and dysfunction.  Describes current pain as severe.  Has failed prolonged conservative treatment.  Denies any new complaints or issues.    Allergies:   Allergies   Allergen Reactions   • Iodides Other (See Comments)     Tolerates betadine topical    UNSURE OF WHAT DYE WAS USED BUT CAUSED \"SKIN BLISTERS\" 50 YRS AGO       Medications:   Home Medications:    Current Outpatient Medications:   •  amLODIPine (NORVASC) 5 MG tablet, Take 5 mg by mouth Every Morning., Disp: , Rfl:   •  aspirin 81 MG EC tablet, Take 81 mg by mouth Daily. TO HOLD 1 WEEK BEFORE SURGERY PER MD INSTRUCTIONS, Disp: , Rfl:   •  atorvastatin (LIPITOR) 20 MG tablet, Take 20 mg by mouth Daily., Disp: , Rfl:   •  benzoyl peroxide 5 % external liquid, Apply  topically to the appropriate area as directed. As directed pre op, Disp: , Rfl:   •  famotidine (PEPCID) 20 MG tablet, Take 20 mg by mouth 2 (Two) Times a Day As Needed., Disp: , Rfl:   •  losartan (COZAAR) 100 MG tablet, Take 100 mg by mouth Every Morning., Disp: , Rfl:   •  meclizine (ANTIVERT) 12.5 MG tablet, Take 12.5 mg by mouth 3 (Three) Times a Day As Needed for Dizziness., Disp: , Rfl:   •  montelukast (SINGULAIR) 10 MG tablet, Take 10 mg by mouth Daily., Disp: , Rfl:   •  traMADol (ULTRAM) 50 MG tablet, Take 1 tablet by mouth Every 4 (Four) Hours As Needed for Moderate Pain., Disp: 60 tablet, Rfl: 0  •  traZODone (DESYREL) 100 MG tablet, Take 100 mg by mouth At Night As Needed., Disp: , Rfl:     Past Medical History:   Diagnosis Date   • Anxiety and depression    • Arthritis    • Asthma    • Cancer (HCC)  " "  • Chronic chest pain     \"HAS BEEN EVALUATED, BUT NO DIAGNOSIS\"   • COPD (chronic obstructive pulmonary disease) ([facility])    • DDD (degenerative disc disease), cervical    • GERD (gastroesophageal reflux disease)    • Headache     HX   • 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   • Hiatal hernia    • History of 2019 novel coronavirus disease (COVID-19)    • History of kidney stones    • Hx of staphylococcal infection     AFTER LEFT TKA/PT NOT AWARE   • Hyperlipidemia    • Hypertension    • Iron deficiency anemia    • Lung nodules     TREATED WITH RADIATION THERAPY X3(\"NEVER TOLD IT WAS CANCER\")   • Memory changes    • Neck and shoulder pain    • Numbness or tingling    • PVD (peripheral vascular disease) ([facility])    • Right knee pain    • Shoulder pain    • SOB (shortness of breath) on exertion    • Vertigo           Past Surgical History:   Procedure Laterality Date   • CATARACT EXTRACTION, BILATERAL     • CERVICAL DISCECTOMY ANTERIOR     • COLONOSCOPY     • ENDOSCOPY     • ROTATOR CUFF REPAIR Bilateral    • TONSILLECTOMY     • TOTAL HIP ARTHROPLASTY Right    • TOTAL KNEE ARTHROPLASTY Left    • TOTAL KNEE ARTHROPLASTY Right 03/15/2017    Procedure: TOTAL KNEE ARTHROPLASTY WITH TORI NAVIGATION;  Surgeon: Lg Jaramillo MD;  Location: [facility];  Service:           Social History     Occupational History   • Not on file   Tobacco Use   • Smoking status: Former     Types: Cigarettes     Quit date: 2001     Years since quittin.5   • Smokeless tobacco: Never   Vaping Use   • Vaping Use: Never used   Substance and Sexual Activity   • Alcohol use: Yes     Comment: RARE USE   • Drug use: No   • Sexual activity: Not on file      Social History     Social History Narrative   • Not on file          Family History   Problem Relation Age of Onset   • Hypertension Other    • Heart disease Other    • Diabetes Other    • Malig Hyperthermia Neg Hx        Review of Systems:     Constitutional:  Denies fever, shaking or " "chills   Eyes:  Denies change in visual acuity   HEENT:  Denies nasal congestion or sore throat   Respiratory:  Denies cough or shortness of breath   Cardiovascular:  Denies chest pain or edema   GI:  Denies abdominal pain, nausea, vomiting, bloody stools or diarrhea   Musculoskeletal:  Denies numbness, tingling or loss of motor function except as outlined above in history of present illness.  Integument:  Denies rash, lesion or ulceration   Neurologic:  Denies headache or focal weakness, deficits      All other pertinent positives and negatives as noted above in HPI.    Physical Exam: 82 y.o. male    Vitals:    01/20/23 0928   Temp: 97.7 °F (36.5 °C)   Weight: 78.3 kg (172 lb 11.2 oz)   Height: 172.7 cm (68\")     General:  Patient is awake and alert.  Appears in no acute distress or discomfort.    Psych:  Affect and demeanor are appropriate.    Eyes:  Conjunctivae and sclerae appear grossly normal.  Eyes track well and EOM seem to be intact.    Ears:  No gross abnormalities.  Hearing adequate for the exam.    Cardiovascular:  Regular rate and rhythm.    Lungs:  Good chest expansion.  Breathing unlabored.    Lymph:  No palpable adenopathy about neck or axilla.    Neck:  Supple.  Normal ROM.  Negative Spurling's for shoulder or arm pain.    Left upper extremity:  Skin benign and intact without evidence for swelling, masses or atrophy.  No palpable masses.  Moderate anterior tenderness.  Shoulder ROM limited and uncomfortable--160° FE, 40° ER,  IR to back pocket.  No evident instability or apprehension.  4 out of 5 strength with resisted forward elevation in the scapular plane and external rotation.  Deltoid fires on exam.  Good strength in the lower arm and hand.  Intact sensation throughout the arm and hand palpable radial pulse with brisk cap refill.    Diagnostic Tests:  Lab Results   Component Value Date    GLUCOSE 121 (H) 01/12/2023    CALCIUM 9.1 01/12/2023     01/12/2023    K 3.8 01/12/2023    CO2 23.0 " 01/12/2023     01/12/2023    BUN 19 01/12/2023    CREATININE 1.40 (H) 01/12/2023    EGFRIFNONA 54 (L) 08/17/2020    BCR 13.6 01/12/2023    ANIONGAP 11.0 01/12/2023     Lab Results   Component Value Date    WBC 4.68 01/12/2023    HGB 11.9 (L) 01/12/2023    HCT 35.7 (L) 01/12/2023    MCV 92.7 01/12/2023     01/12/2023     Lab Results   Component Value Date    INR 1.09 08/17/2020    INR 1.06 05/02/2019    PROTIME 14.0 08/17/2020    PROTIME 13.6 05/02/2019       Imaging:  Previous x-rays of the shoulder are reviewed.  The x-rays show significant rotator cuff tear arthropathy with a diminished acromiohumeral interval, joint space narrowing, osteophyte formation, and subchondral sclerosis.      Assessment:  Left shoulder rotator cuff tear arthropathy    Plan: We had a thorough discussion regarding the risks, benefits and alternatives to an arthroplasty and non-surgical management versus surgery.  I explained that surgical risks include infection, hematoma, hardware related complications including failure of fixation, loosening, fracture, persistent pain and/or loss of motion, iatrogenic nerve and/or blood vessel injury resulting in permanent weakness, numbness or dysfunction, DVT, PE, positioning related neuropraxia, and anesthesia related complications resulting in death.  We discussed the indication for a reverse as opposed to a standard total shoulder and the risks inherent to the reverse including notching, glenoid loosening, instability, and traction related neuropraxia, any of which could result in persistent pain or problems requiring further surgery.  Lastly, we discussed the rehab and all that will be expected of the patient post operatively to ensure an optimal outcome.  The patient voiced understanding of the risks, benefits, and alternative forms of treatment that were discussed and the patient consents to proceed with the reverse arthroplasty.      Patient is planning for hospital dismissal same  day of surgery.  Denies history of DVT or metal allergy.      Danya Estes, APRN  01/20/23

## 2023-02-06 ENCOUNTER — TELEPHONE (OUTPATIENT)
Dept: ORTHOPEDIC SURGERY | Facility: CLINIC | Age: 83
End: 2023-02-06

## 2023-02-06 NOTE — TELEPHONE ENCOUNTER
Caller: KELLY BELTRAN    Relationship to patient: SELF    Best call back number: [phone]    Type of visit: F/U    Additional notes: PT REQUESTED TO SPEAK WITH HEBER LEROY TO DISCUSS SX DETAILS, AND IS SCHEDULED FOR 2-10-23. PLEASE ADVISE PT IF HE NEEDS TO BE RESCHEDULED WITH DR. GAY.

## 2023-02-09 DIAGNOSIS — M25.511 CHRONIC RIGHT SHOULDER PAIN: Primary | ICD-10-CM

## 2023-02-09 DIAGNOSIS — G89.29 CHRONIC LEFT SHOULDER PAIN: ICD-10-CM

## 2023-02-09 DIAGNOSIS — G89.29 CHRONIC RIGHT SHOULDER PAIN: Primary | ICD-10-CM

## 2023-02-09 DIAGNOSIS — M25.512 CHRONIC LEFT SHOULDER PAIN: ICD-10-CM

## 2023-02-10 ENCOUNTER — OFFICE VISIT (OUTPATIENT)
Dept: ORTHOPEDIC SURGERY | Facility: CLINIC | Age: 83
End: 2023-02-10
Payer: MEDICARE

## 2023-02-10 VITALS — TEMPERATURE: 97.1 F | WEIGHT: 177 LBS | BODY MASS INDEX: 26.83 KG/M2 | HEIGHT: 68 IN

## 2023-02-10 DIAGNOSIS — Z01.818 PREOP EXAMINATION: Primary | ICD-10-CM

## 2023-02-10 PROCEDURE — S0260 H&P FOR SURGERY: HCPCS | Performed by: NURSE PRACTITIONER

## 2023-02-10 RX ORDER — TRAMADOL HYDROCHLORIDE 50 MG/1
50 TABLET ORAL EVERY 4 HOURS PRN
Qty: 60 TABLET | Refills: 0 | Status: SHIPPED | OUTPATIENT
Start: 2023-02-10

## 2023-02-13 NOTE — PROGRESS NOTES
"   History & Physical         Patient: Sundeep Mason    YOB: 1940    Medical Record Number: [id number]    Chief Complaints:   Chief Complaint   Patient presents with   • Left Shoulder - Follow-up, Pain       History of Present Illness: 82 y.o. male comes in today for upcoming shoulder replacement surgery.  His daughter, Caterina, is present during the visit.  Reports a long history of progressively worsening pain, motion loss, and dysfunction.  Describes current pain as severe.  Has failed prolonged conservative treatment.  Denies any new complaints or issues.    Allergies:   Allergies   Allergen Reactions   • Iodides Other (See Comments)     Tolerates betadine topical    UNSURE OF WHAT DYE WAS USED BUT CAUSED \"SKIN BLISTERS\" 50 YRS AGO       Medications:   Home Medications:    Current Outpatient Medications:   •  amLODIPine (NORVASC) 5 MG tablet, Take 5 mg by mouth Every Morning., Disp: , Rfl:   •  aspirin 81 MG EC tablet, Take 81 mg by mouth Daily. TO HOLD 1 WEEK BEFORE SURGERY PER MD INSTRUCTIONS, Disp: , Rfl:   •  atorvastatin (LIPITOR) 20 MG tablet, Take 20 mg by mouth Daily., Disp: , Rfl:   •  benzoyl peroxide 5 % external liquid, Apply  topically to the appropriate area as directed. As directed pre op, Disp: , Rfl:   •  famotidine (PEPCID) 20 MG tablet, Take 20 mg by mouth 2 (Two) Times a Day As Needed., Disp: , Rfl:   •  losartan (COZAAR) 100 MG tablet, Take 100 mg by mouth Every Morning., Disp: , Rfl:   •  meclizine (ANTIVERT) 12.5 MG tablet, Take 12.5 mg by mouth 3 (Three) Times a Day As Needed for Dizziness., Disp: , Rfl:   •  montelukast (SINGULAIR) 10 MG tablet, Take 10 mg by mouth Daily., Disp: , Rfl:   •  traMADol (ULTRAM) 50 MG tablet, Take 1 tablet by mouth Every 4 (Four) Hours As Needed for Moderate Pain., Disp: 60 tablet, Rfl: 0  •  traZODone (DESYREL) 100 MG tablet, Take 100 mg by mouth At Night As Needed., Disp: , Rfl:     Past Medical History:   Diagnosis Date   • Anxiety and " "depression    • Arthritis    • Asthma    • Cancer (HCC)    • Chronic chest pain     \"HAS BEEN EVALUATED, BUT NO DIAGNOSIS\"   • COPD (chronic obstructive pulmonary disease) (HCC)    • DDD (degenerative disc disease), cervical    • GERD (gastroesophageal reflux disease)    • Headache     HX   • 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   • Hiatal hernia    • History of 2019 novel coronavirus disease (COVID-19)    • History of kidney stones    • Hx of staphylococcal infection     AFTER LEFT TKA/PT NOT AWARE   • Hyperlipidemia    • Hypertension    • Iron deficiency anemia    • Lung nodules     TREATED WITH RADIATION THERAPY X3(\"NEVER TOLD IT WAS CANCER\")   • Memory changes    • Neck and shoulder pain    • Numbness or tingling    • PVD (peripheral vascular disease) ([facility])    • Right knee pain    • Shoulder pain    • SOB (shortness of breath) on exertion    • Vertigo           Past Surgical History:   Procedure Laterality Date   • CATARACT EXTRACTION, BILATERAL     • CERVICAL DISCECTOMY ANTERIOR     • COLONOSCOPY     • ENDOSCOPY     • ROTATOR CUFF REPAIR Bilateral    • TONSILLECTOMY     • TOTAL HIP ARTHROPLASTY Right    • TOTAL KNEE ARTHROPLASTY Left    • TOTAL KNEE ARTHROPLASTY Right 03/15/2017    Procedure: TOTAL KNEE ARTHROPLASTY WITH TORI NAVIGATION;  Surgeon: Lg Jaramillo MD;  Location: [facility];  Service:           Social History     Occupational History   • Not on file   Tobacco Use   • Smoking status: Former     Types: Cigarettes     Quit date: 2001     Years since quittin.6   • Smokeless tobacco: Never   Vaping Use   • Vaping Use: Never used   Substance and Sexual Activity   • Alcohol use: Yes     Comment: RARE USE   • Drug use: No   • Sexual activity: Not on file      Social History     Social History Narrative   • Not on file          Family History   Problem Relation Age of Onset   • Hypertension Other    • Heart disease Other    • Diabetes Other    • Malig Hyperthermia Neg Hx        Review of " "Systems:     Constitutional:  Denies fever, shaking or chills   Eyes:  Denies change in visual acuity   HEENT:  Denies nasal congestion or sore throat   Respiratory:  Denies cough or shortness of breath   Cardiovascular:  Denies chest pain or edema   GI:  Denies abdominal pain, nausea, vomiting, bloody stools or diarrhea   Musculoskeletal:  Denies numbness, tingling or loss of motor function except as outlined above in history of present illness.  Integument:  Denies rash, lesion or ulceration   Neurologic:  Denies headache or focal weakness, deficits      All other pertinent positives and negatives as noted above in HPI.    Physical Exam: 82 y.o. male    Vitals:    02/10/23 1513   Temp: 97.1 °F (36.2 °C)   Weight: 80.3 kg (177 lb)   Height: 172.7 cm (68\")     General:  Patient is awake and alert.  Appears in no acute distress or discomfort.    Psych:  Affect and demeanor are appropriate.    Eyes:  Conjunctivae and sclerae appear grossly normal.  Eyes track well and EOM seem to be intact.    Ears:  No gross abnormalities.  Hearing adequate for the exam.    Cardiovascular:  Regular rate and rhythm.    Lungs:  Good chest expansion.  Breathing unlabored.    Lymph:  No palpable adenopathy about neck or axilla.    Neck:  Supple.  Normal ROM.  Negative Spurling's for shoulder or arm pain.    Left upper extremity:  Skin benign and intact without evidence for swelling, masses or atrophy.  No palpable masses.  Moderate anterior tenderness.  Shoulder ROM limited and uncomfortable--160° FE, 35° ER, IR to back pocket.  No evident instability or apprehension.   4 out of 5 strength with resisted forward elevation in the scapular plane and external rotation.  Deltoid fires on exam.  Good strength in the lower arm and hand.  Intact sensation throughout the arm and hand palpable radial pulse with brisk cap refill.    Diagnostic Tests:  Lab Results   Component Value Date    GLUCOSE 121 (H) 01/12/2023    CALCIUM 9.1 01/12/2023    NA " 141 01/12/2023    K 3.8 01/12/2023    CO2 23.0 01/12/2023     01/12/2023    BUN 19 01/12/2023    CREATININE 1.40 (H) 01/12/2023    EGFRIFNONA 54 (L) 08/17/2020    BCR 13.6 01/12/2023    ANIONGAP 11.0 01/12/2023     Lab Results   Component Value Date    WBC 4.68 01/12/2023    HGB 11.9 (L) 01/12/2023    HCT 35.7 (L) 01/12/2023    MCV 92.7 01/12/2023     01/12/2023     Lab Results   Component Value Date    INR 1.09 08/17/2020    INR 1.06 05/02/2019    PROTIME 14.0 08/17/2020    PROTIME 13.6 05/02/2019       Imaging:  Previous x-rays of the shoulder are reviewed.  The x-rays show significant rotator cuff tear arthropathy with a diminished acromiohumeral interval, joint space narrowing, osteophyte formation, and subchondral sclerosis.    Assessment:  Left shoulder rotator cuff tear arthropathy    Plan: We had a thorough discussion regarding the risks, benefits and alternatives to an arthroplasty and non-surgical management versus surgery.  I explained that surgical risks include infection, hematoma, hardware related complications including failure of fixation, loosening, fracture, persistent pain and/or loss of motion, iatrogenic nerve and/or blood vessel injury resulting in permanent weakness, numbness or dysfunction, DVT, PE, positioning related neuropraxia, and anesthesia related complications resulting in death.  We discussed the indication for a reverse as opposed to a standard total shoulder and the risks inherent to the reverse including notching, glenoid loosening, instability, and traction related neuropraxia, any of which could result in persistent pain or problems requiring further surgery.  Lastly, we discussed the rehab and all that will be expected of the patient post operatively to ensure an optimal outcome.  The patient voiced understanding of the risks, benefits, and alternative forms of treatment that were discussed and the patient consents to proceed with the reverse arthroplasty.       Patient is planning for hospital dismissal same day of surgery.  Denies history of DVT or metal allergy.    Danya Estes, APRN  02/13/23

## 2023-02-23 ENCOUNTER — PRE-ADMISSION TESTING (OUTPATIENT)
Dept: PREADMISSION TESTING | Facility: HOSPITAL | Age: 83
End: 2023-02-23
Payer: MEDICARE

## 2023-02-23 VITALS
SYSTOLIC BLOOD PRESSURE: 145 MMHG | RESPIRATION RATE: 18 BRPM | TEMPERATURE: 97.8 F | DIASTOLIC BLOOD PRESSURE: 64 MMHG | HEART RATE: 95 BPM | OXYGEN SATURATION: 98 % | WEIGHT: 171.7 LBS | HEIGHT: 68 IN | BODY MASS INDEX: 26.02 KG/M2

## 2023-02-23 LAB
ANION GAP SERPL CALCULATED.3IONS-SCNC: 9.4 MMOL/L (ref 5–15)
BUN SERPL-MCNC: 22 MG/DL (ref 8–23)
BUN/CREAT SERPL: 15.5 (ref 7–25)
CALCIUM SPEC-SCNC: 9 MG/DL (ref 8.6–10.5)
CHLORIDE SERPL-SCNC: 104 MMOL/L (ref 98–107)
CO2 SERPL-SCNC: 22.6 MMOL/L (ref 22–29)
CREAT SERPL-MCNC: 1.42 MG/DL (ref 0.76–1.27)
DEPRECATED RDW RBC AUTO: 44.5 FL (ref 37–54)
EGFRCR SERPLBLD CKD-EPI 2021: 49.3 ML/MIN/1.73
ERYTHROCYTE [DISTWIDTH] IN BLOOD BY AUTOMATED COUNT: 13.1 % (ref 12.3–15.4)
GLUCOSE SERPL-MCNC: 176 MG/DL (ref 65–99)
HCT VFR BLD AUTO: 32.8 % (ref 37.5–51)
HGB BLD-MCNC: 11 G/DL (ref 13–17.7)
MCH RBC QN AUTO: 30.6 PG (ref 26.6–33)
MCHC RBC AUTO-ENTMCNC: 33.5 G/DL (ref 31.5–35.7)
MCV RBC AUTO: 91.1 FL (ref 79–97)
PLATELET # BLD AUTO: 137 10*3/MM3 (ref 140–450)
PMV BLD AUTO: 11.2 FL (ref 6–12)
POTASSIUM SERPL-SCNC: 3.6 MMOL/L (ref 3.5–5.2)
RBC # BLD AUTO: 3.6 10*6/MM3 (ref 4.14–5.8)
SODIUM SERPL-SCNC: 136 MMOL/L (ref 136–145)
WBC NRBC COR # BLD: 3.44 10*3/MM3 (ref 3.4–10.8)

## 2023-02-23 PROCEDURE — 85027 COMPLETE CBC AUTOMATED: CPT

## 2023-02-23 PROCEDURE — 36415 COLL VENOUS BLD VENIPUNCTURE: CPT

## 2023-02-23 PROCEDURE — 80048 BASIC METABOLIC PNL TOTAL CA: CPT

## 2023-02-23 RX ORDER — TRAZODONE HYDROCHLORIDE 100 MG/1
50-100 TABLET ORAL NIGHTLY
COMMUNITY
Start: 2023-02-10 | End: 2024-02-10

## 2023-02-23 RX ORDER — FLUTICASONE FUROATE, UMECLIDINIUM BROMIDE AND VILANTEROL TRIFENATATE 100; 62.5; 25 UG/1; UG/1; UG/1
1 POWDER RESPIRATORY (INHALATION)
COMMUNITY

## 2023-02-23 RX ORDER — FAMOTIDINE 20 MG/1
20 TABLET, FILM COATED ORAL 2 TIMES DAILY
COMMUNITY

## 2023-03-01 ENCOUNTER — TELEPHONE (OUTPATIENT)
Dept: ORTHOPEDIC SURGERY | Facility: CLINIC | Age: 83
End: 2023-03-01
Payer: MEDICARE

## 2023-03-01 ENCOUNTER — TELEPHONE (OUTPATIENT)
Dept: ORTHOPEDIC SURGERY | Facility: HOSPITAL | Age: 83
End: 2023-03-01
Payer: MEDICARE

## 2023-03-01 NOTE — TELEPHONE ENCOUNTER
Caller: KELLY BELTRAN     Relationship to patient: PATIENT     Best call back number:[phone]    Patient is needing: PATIENT SCHEDULED FOR SX L/SHOULDER 03/09/23  PATIENT HURT HIS LEG Friday NIGHT WHEN GETTING IN THE CAR.  PEELED SKIN BACK ABOUT THE SIZE OF A SUZY ON HIS RIGHT ANKLE _IT'S TRYING TO HEAL.  PATIENT WANTS TO KNOW IF DR GAY CAN GO FORWARD WITH SX  PLEASE CALL PATIENT       PATIENT HAS A PICTURE AND CAN SEND THROUGH MY CHART IF NEEDED

## 2023-03-01 NOTE — TELEPHONE ENCOUNTER
Spoke with patient and told him that Dr. Guido had reviewed the photo of his leg and to watch for redness and any discharge.  If he feels that it is getting worse to call or send a [facility].

## 2023-03-01 NOTE — TELEPHONE ENCOUNTER
Risk Factor yes no   Age >75 X    BMI <20 >40  X   Patient History     Chronic Pain (2 or more meds/Pain Management)  X   ETOH (more than 3 drinks Daily)  X   Uncontrolled Depression/Bipolar/Schizoaffective Disorder  X   Arrhythmias  X   Stent placement/MI  X   DVT/PE  X   Pacemaker  X   HTN (uncontrolled or requiring more than 2 medications)  X   CHF/Retained fluids/Edema  X   Stroke with Residual   X   COPD/Asthma X    NIKOLE--Non-compliant with CPAP  X   Diabetes (on insulin or more than 2 meds)         A1C:  X   BPH/Urinary retention (on medication)  X   CKD  X   Home environment and support     Current ambulation status (use of cane, walker, W/C, Multiple falls/weakness)  X   Stairs to enter and throughout home X    Lives Alone X    Doesn’t have support at home  X     Outpatient Screening Assessment    Home needs: (Walker/BSC):  Total Shoulder   ? Steps 2 steps with Rail   Caregiver 24-48hrs post-discharge: Kids     Discharge Plan:  Total Shoulder      Prescriptions: Meds to bed    Home medications:   ? Blood thinner/anti-coag therapy--ASA   ? BPH or diuretic--  ? BP meds--Losartan, Norvasc  ? Pain/Anti-inflammatories--Ultram  Pre-op Education:  Educate patient on spinal anesthesia/pain control:  ? patient verbalize understanding    Educate patient on hospital course/timeline:  ?  patient verbalize understanding    Joint Care Class:  ?  yes ? no  Notes:   Trelegy  Accu check 176  Creatinine 1.42

## 2023-03-09 ENCOUNTER — ANESTHESIA EVENT (OUTPATIENT)
Dept: PERIOP | Facility: HOSPITAL | Age: 83
End: 2023-03-09
Payer: MEDICARE

## 2023-03-09 ENCOUNTER — ANESTHESIA (OUTPATIENT)
Dept: PERIOP | Facility: HOSPITAL | Age: 83
End: 2023-03-09
Payer: MEDICARE

## 2023-03-09 ENCOUNTER — HOSPITAL ENCOUNTER (OUTPATIENT)
Facility: HOSPITAL | Age: 83
Setting detail: HOSPITAL OUTPATIENT SURGERY
Discharge: HOME | End: 2023-03-09
Attending: ORTHOPAEDIC SURGERY | Admitting: ORTHOPAEDIC SURGERY
Payer: MEDICARE

## 2023-03-09 ENCOUNTER — APPOINTMENT (OUTPATIENT)
Dept: GENERAL RADIOLOGY | Facility: HOSPITAL | Age: 83
End: 2023-03-09
Payer: MEDICARE

## 2023-03-09 VITALS
TEMPERATURE: 97.5 F | OXYGEN SATURATION: 93 % | HEART RATE: 72 BPM | DIASTOLIC BLOOD PRESSURE: 71 MMHG | HEIGHT: 68 IN | BODY MASS INDEX: 26.03 KG/M2 | SYSTOLIC BLOOD PRESSURE: 116 MMHG | RESPIRATION RATE: 16 BRPM | WEIGHT: 171.74 LBS

## 2023-03-09 DIAGNOSIS — G89.29 CHRONIC RIGHT SHOULDER PAIN: ICD-10-CM

## 2023-03-09 DIAGNOSIS — M19.019 ARTHRITIS OF SHOULDER: ICD-10-CM

## 2023-03-09 DIAGNOSIS — M25.511 CHRONIC RIGHT SHOULDER PAIN: ICD-10-CM

## 2023-03-09 DIAGNOSIS — Z96.612 H/O TOTAL SHOULDER REPLACEMENT, LEFT: Primary | ICD-10-CM

## 2023-03-09 PROCEDURE — 25010000002 ROPIVACAINE PER 1 MG: Performed by: ANESTHESIOLOGY

## 2023-03-09 PROCEDURE — 25010000002 PHENYLEPHRINE 10 MG/ML SOLUTION: Performed by: ANESTHESIOLOGY

## 2023-03-09 PROCEDURE — C1776 JOINT DEVICE (IMPLANTABLE): HCPCS | Performed by: ORTHOPAEDIC SURGERY

## 2023-03-09 PROCEDURE — 25010000002 FENTANYL CITRATE (PF) 50 MCG/ML SOLUTION: Performed by: ANESTHESIOLOGY

## 2023-03-09 PROCEDURE — 73020 X-RAY EXAM OF SHOULDER: CPT

## 2023-03-09 PROCEDURE — 25010000002 ONDANSETRON PER 1 MG: Performed by: ANESTHESIOLOGY

## 2023-03-09 PROCEDURE — 25010000002 DEXAMETHASONE PER 1 MG: Performed by: ANESTHESIOLOGY

## 2023-03-09 PROCEDURE — 25010000002 DEXAMETHASONE SODIUM PHOSPHATE 20 MG/5ML SOLUTION: Performed by: ANESTHESIOLOGY

## 2023-03-09 PROCEDURE — 25010000002 VANCOMYCIN 10 G RECONSTITUTED SOLUTION: Performed by: ORTHOPAEDIC SURGERY

## 2023-03-09 PROCEDURE — 25010000002 CEFAZOLIN IN DEXTROSE 2-4 GM/100ML-% SOLUTION: Performed by: ORTHOPAEDIC SURGERY

## 2023-03-09 PROCEDURE — 97535 SELF CARE MNGMENT TRAINING: CPT

## 2023-03-09 PROCEDURE — 97110 THERAPEUTIC EXERCISES: CPT

## 2023-03-09 PROCEDURE — 97165 OT EVAL LOW COMPLEX 30 MIN: CPT

## 2023-03-09 PROCEDURE — 25010000002 MIDAZOLAM PER 1 MG: Performed by: ANESTHESIOLOGY

## 2023-03-09 PROCEDURE — 25010000002 PROPOFOL 10 MG/ML EMULSION: Performed by: ANESTHESIOLOGY

## 2023-03-09 PROCEDURE — 23472 RECONSTRUCT SHOULDER JOINT: CPT | Performed by: ORTHOPAEDIC SURGERY

## 2023-03-09 DEVICE — IMPLANTABLE DEVICE
Type: IMPLANTABLE DEVICE | Site: SHOULDER | Status: FUNCTIONAL
Brand: COMPREHENSIVE® REVERSE SHOULDER

## 2023-03-09 DEVICE — BEAR HUM PROLNG STD 40MM: Type: IMPLANTABLE DEVICE | Site: SHOULDER | Status: FUNCTIONAL

## 2023-03-09 DEVICE — SUT NONABS MAXBRAID/PE NMBR2 C7 38IN BLU 900335: Type: IMPLANTABLE DEVICE | Site: SHOULDER | Status: FUNCTIONAL

## 2023-03-09 DEVICE — COMP GLEN VERSA STD 40MM: Type: IMPLANTABLE DEVICE | Site: SHOULDER | Status: FUNCTIONAL

## 2023-03-09 DEVICE — TRY HUM/SHLDR COMPREHENSIVE/REVERSE MINI COCR STD PLS6: Type: IMPLANTABLE DEVICE | Site: SHOULDER | Status: FUNCTIONAL

## 2023-03-09 DEVICE — STEM HUM/SHLDR COMPREHENSIVE MIC 14X55MM: Type: IMPLANTABLE DEVICE | Site: SHOULDER | Status: FUNCTIONAL

## 2023-03-09 DEVICE — DEV CONTRL TISS STRATAFIX SPIRAL MNCRYL UD 3/0 PLS 30CM: Type: IMPLANTABLE DEVICE | Site: SHOULDER | Status: FUNCTIONAL

## 2023-03-09 DEVICE — TOTL SHLDER REV: Type: IMPLANTABLE DEVICE | Status: FUNCTIONAL

## 2023-03-09 RX ORDER — PREGABALIN 75 MG/1
150 CAPSULE ORAL ONCE
Status: COMPLETED | OUTPATIENT
Start: 2023-03-09 | End: 2023-03-09

## 2023-03-09 RX ORDER — ROCURONIUM BROMIDE 10 MG/ML
INJECTION, SOLUTION INTRAVENOUS AS NEEDED
Status: DISCONTINUED | OUTPATIENT
Start: 2023-03-09 | End: 2023-03-09 | Stop reason: SURG

## 2023-03-09 RX ORDER — ONDANSETRON 4 MG/1
4 TABLET, FILM COATED ORAL ONCE AS NEEDED
Status: DISCONTINUED | OUTPATIENT
Start: 2023-03-09 | End: 2023-03-09 | Stop reason: HOSPADM

## 2023-03-09 RX ORDER — ONDANSETRON 4 MG/1
4 TABLET, FILM COATED ORAL EVERY 8 HOURS PRN
Qty: 12 TABLET | Refills: 0 | Status: SHIPPED | OUTPATIENT
Start: 2023-03-09

## 2023-03-09 RX ORDER — HYDROMORPHONE HYDROCHLORIDE 1 MG/ML
0.25 INJECTION, SOLUTION INTRAMUSCULAR; INTRAVENOUS; SUBCUTANEOUS
Status: DISCONTINUED | OUTPATIENT
Start: 2023-03-09 | End: 2023-03-09 | Stop reason: HOSPADM

## 2023-03-09 RX ORDER — PHENYLEPHRINE HYDROCHLORIDE 10 MG/ML
INJECTION INTRAVENOUS AS NEEDED
Status: DISCONTINUED | OUTPATIENT
Start: 2023-03-09 | End: 2023-03-09 | Stop reason: SURG

## 2023-03-09 RX ORDER — FAMOTIDINE 10 MG/ML
20 INJECTION, SOLUTION INTRAVENOUS ONCE
Status: COMPLETED | OUTPATIENT
Start: 2023-03-09 | End: 2023-03-09

## 2023-03-09 RX ORDER — HYDRALAZINE HYDROCHLORIDE 20 MG/ML
5 INJECTION INTRAMUSCULAR; INTRAVENOUS
Status: DISCONTINUED | OUTPATIENT
Start: 2023-03-09 | End: 2023-03-09 | Stop reason: HOSPADM

## 2023-03-09 RX ORDER — HYDROCODONE BITARTRATE AND ACETAMINOPHEN 7.5; 325 MG/1; MG/1
TABLET ORAL
Qty: 42 TABLET | Refills: 0 | Status: SHIPPED | OUTPATIENT
Start: 2023-03-09 | End: 2023-03-23 | Stop reason: SDUPTHER

## 2023-03-09 RX ORDER — EPHEDRINE SULFATE 50 MG/ML
INJECTION INTRAVENOUS AS NEEDED
Status: DISCONTINUED | OUTPATIENT
Start: 2023-03-09 | End: 2023-03-09 | Stop reason: SURG

## 2023-03-09 RX ORDER — EPHEDRINE SULFATE 50 MG/ML
5 INJECTION, SOLUTION INTRAVENOUS ONCE AS NEEDED
Status: DISCONTINUED | OUTPATIENT
Start: 2023-03-09 | End: 2023-03-09 | Stop reason: HOSPADM

## 2023-03-09 RX ORDER — DROPERIDOL 2.5 MG/ML
0.62 INJECTION, SOLUTION INTRAMUSCULAR; INTRAVENOUS
Status: DISCONTINUED | OUTPATIENT
Start: 2023-03-09 | End: 2023-03-09 | Stop reason: HOSPADM

## 2023-03-09 RX ORDER — ONDANSETRON 2 MG/ML
INJECTION INTRAMUSCULAR; INTRAVENOUS AS NEEDED
Status: DISCONTINUED | OUTPATIENT
Start: 2023-03-09 | End: 2023-03-09 | Stop reason: SURG

## 2023-03-09 RX ORDER — SODIUM CHLORIDE, SODIUM LACTATE, POTASSIUM CHLORIDE, CALCIUM CHLORIDE 600; 310; 30; 20 MG/100ML; MG/100ML; MG/100ML; MG/100ML
INJECTION, SOLUTION INTRAVENOUS CONTINUOUS PRN
Status: DISCONTINUED | OUTPATIENT
Start: 2023-03-09 | End: 2023-03-09 | Stop reason: SURG

## 2023-03-09 RX ORDER — FENTANYL CITRATE 50 UG/ML
25 INJECTION, SOLUTION INTRAMUSCULAR; INTRAVENOUS
Status: DISCONTINUED | OUTPATIENT
Start: 2023-03-09 | End: 2023-03-09 | Stop reason: HOSPADM

## 2023-03-09 RX ORDER — ACETAMINOPHEN 325 MG/1
650 TABLET ORAL 2 TIMES DAILY PRN
Qty: 60 TABLET | Refills: 0 | Status: SHIPPED | OUTPATIENT
Start: 2023-03-09

## 2023-03-09 RX ORDER — ONDANSETRON 2 MG/ML
4 INJECTION INTRAMUSCULAR; INTRAVENOUS ONCE AS NEEDED
Status: DISCONTINUED | OUTPATIENT
Start: 2023-03-09 | End: 2023-03-09 | Stop reason: HOSPADM

## 2023-03-09 RX ORDER — LIDOCAINE HYDROCHLORIDE 10 MG/ML
0.5 INJECTION, SOLUTION EPIDURAL; INFILTRATION; INTRACAUDAL; PERINEURAL ONCE AS NEEDED
Status: DISCONTINUED | OUTPATIENT
Start: 2023-03-09 | End: 2023-03-09 | Stop reason: HOSPADM

## 2023-03-09 RX ORDER — LIDOCAINE HYDROCHLORIDE 20 MG/ML
INJECTION, SOLUTION EPIDURAL; INFILTRATION; INTRACAUDAL; PERINEURAL AS NEEDED
Status: DISCONTINUED | OUTPATIENT
Start: 2023-03-09 | End: 2023-03-09 | Stop reason: SURG

## 2023-03-09 RX ORDER — SODIUM CHLORIDE, SODIUM LACTATE, POTASSIUM CHLORIDE, CALCIUM CHLORIDE 600; 310; 30; 20 MG/100ML; MG/100ML; MG/100ML; MG/100ML
9 INJECTION, SOLUTION INTRAVENOUS CONTINUOUS
Status: DISCONTINUED | OUTPATIENT
Start: 2023-03-09 | End: 2023-03-09 | Stop reason: HOSPADM

## 2023-03-09 RX ORDER — MAGNESIUM HYDROXIDE 1200 MG/15ML
LIQUID ORAL AS NEEDED
Status: DISCONTINUED | OUTPATIENT
Start: 2023-03-09 | End: 2023-03-09 | Stop reason: HOSPADM

## 2023-03-09 RX ORDER — TRANEXAMIC ACID 100 MG/ML
INJECTION, SOLUTION INTRAVENOUS AS NEEDED
Status: DISCONTINUED | OUTPATIENT
Start: 2023-03-09 | End: 2023-03-09 | Stop reason: SURG

## 2023-03-09 RX ORDER — DOCUSATE SODIUM 100 MG/1
100 CAPSULE, LIQUID FILLED ORAL 2 TIMES DAILY
Qty: 60 CAPSULE | Refills: 0 | Status: SHIPPED | OUTPATIENT
Start: 2023-03-09

## 2023-03-09 RX ORDER — MELOXICAM 15 MG/1
15 TABLET ORAL ONCE
Status: COMPLETED | OUTPATIENT
Start: 2023-03-09 | End: 2023-03-09

## 2023-03-09 RX ORDER — FENTANYL CITRATE 50 UG/ML
50 INJECTION, SOLUTION INTRAMUSCULAR; INTRAVENOUS
Status: DISCONTINUED | OUTPATIENT
Start: 2023-03-09 | End: 2023-03-09 | Stop reason: HOSPADM

## 2023-03-09 RX ORDER — HYDROCODONE BITARTRATE AND ACETAMINOPHEN 5; 325 MG/1; MG/1
1 TABLET ORAL ONCE AS NEEDED
Status: DISCONTINUED | OUTPATIENT
Start: 2023-03-09 | End: 2023-03-09 | Stop reason: HOSPADM

## 2023-03-09 RX ORDER — FLUMAZENIL 0.1 MG/ML
0.2 INJECTION INTRAVENOUS AS NEEDED
Status: DISCONTINUED | OUTPATIENT
Start: 2023-03-09 | End: 2023-03-09 | Stop reason: HOSPADM

## 2023-03-09 RX ORDER — CEFAZOLIN SODIUM 2 G/100ML
2 INJECTION, SOLUTION INTRAVENOUS ONCE
Status: COMPLETED | OUTPATIENT
Start: 2023-03-09 | End: 2023-03-09

## 2023-03-09 RX ORDER — PROPOFOL 10 MG/ML
VIAL (ML) INTRAVENOUS AS NEEDED
Status: DISCONTINUED | OUTPATIENT
Start: 2023-03-09 | End: 2023-03-09 | Stop reason: SURG

## 2023-03-09 RX ORDER — MIDAZOLAM HYDROCHLORIDE 1 MG/ML
0.5 INJECTION INTRAMUSCULAR; INTRAVENOUS
Status: DISCONTINUED | OUTPATIENT
Start: 2023-03-09 | End: 2023-03-09 | Stop reason: HOSPADM

## 2023-03-09 RX ORDER — SODIUM CHLORIDE 0.9 % (FLUSH) 0.9 %
3-10 SYRINGE (ML) INJECTION AS NEEDED
Status: DISCONTINUED | OUTPATIENT
Start: 2023-03-09 | End: 2023-03-09 | Stop reason: HOSPADM

## 2023-03-09 RX ORDER — IPRATROPIUM BROMIDE AND ALBUTEROL SULFATE 2.5; .5 MG/3ML; MG/3ML
3 SOLUTION RESPIRATORY (INHALATION) ONCE AS NEEDED
Status: DISCONTINUED | OUTPATIENT
Start: 2023-03-09 | End: 2023-03-09 | Stop reason: HOSPADM

## 2023-03-09 RX ORDER — HYDROCODONE BITARTRATE AND ACETAMINOPHEN 7.5; 325 MG/1; MG/1
2 TABLET ORAL ONCE AS NEEDED
Status: DISCONTINUED | OUTPATIENT
Start: 2023-03-09 | End: 2023-03-09 | Stop reason: HOSPADM

## 2023-03-09 RX ORDER — PROMETHAZINE HYDROCHLORIDE 25 MG/1
25 TABLET ORAL ONCE AS NEEDED
Status: DISCONTINUED | OUTPATIENT
Start: 2023-03-09 | End: 2023-03-09 | Stop reason: HOSPADM

## 2023-03-09 RX ORDER — CEFAZOLIN SODIUM 2 G/100ML
2 INJECTION, SOLUTION INTRAVENOUS EVERY 8 HOURS
Status: DISCONTINUED | OUTPATIENT
Start: 2023-03-09 | End: 2023-03-09 | Stop reason: HOSPADM

## 2023-03-09 RX ORDER — HYDROCODONE BITARTRATE AND ACETAMINOPHEN 7.5; 325 MG/1; MG/1
1 TABLET ORAL EVERY 4 HOURS PRN
Status: DISCONTINUED | OUTPATIENT
Start: 2023-03-09 | End: 2023-03-09 | Stop reason: HOSPADM

## 2023-03-09 RX ORDER — ROPIVACAINE HYDROCHLORIDE 5 MG/ML
INJECTION, SOLUTION EPIDURAL; INFILTRATION; PERINEURAL
Status: COMPLETED | OUTPATIENT
Start: 2023-03-09 | End: 2023-03-09

## 2023-03-09 RX ORDER — SODIUM CHLORIDE 0.9 % (FLUSH) 0.9 %
3 SYRINGE (ML) INJECTION EVERY 12 HOURS SCHEDULED
Status: DISCONTINUED | OUTPATIENT
Start: 2023-03-09 | End: 2023-03-09 | Stop reason: HOSPADM

## 2023-03-09 RX ORDER — DIPHENHYDRAMINE HYDROCHLORIDE 50 MG/ML
12.5 INJECTION INTRAMUSCULAR; INTRAVENOUS
Status: DISCONTINUED | OUTPATIENT
Start: 2023-03-09 | End: 2023-03-09 | Stop reason: HOSPADM

## 2023-03-09 RX ORDER — ACETAMINOPHEN 325 MG/1
1000 TABLET ORAL ONCE
Status: COMPLETED | OUTPATIENT
Start: 2023-03-09 | End: 2023-03-09

## 2023-03-09 RX ORDER — DEXAMETHASONE SODIUM PHOSPHATE 4 MG/ML
INJECTION, SOLUTION INTRA-ARTICULAR; INTRALESIONAL; INTRAMUSCULAR; INTRAVENOUS; SOFT TISSUE AS NEEDED
Status: DISCONTINUED | OUTPATIENT
Start: 2023-03-09 | End: 2023-03-09 | Stop reason: SURG

## 2023-03-09 RX ORDER — NALOXONE HCL 0.4 MG/ML
0.2 VIAL (ML) INJECTION AS NEEDED
Status: DISCONTINUED | OUTPATIENT
Start: 2023-03-09 | End: 2023-03-09 | Stop reason: HOSPADM

## 2023-03-09 RX ORDER — LABETALOL HYDROCHLORIDE 5 MG/ML
5 INJECTION, SOLUTION INTRAVENOUS
Status: DISCONTINUED | OUTPATIENT
Start: 2023-03-09 | End: 2023-03-09 | Stop reason: HOSPADM

## 2023-03-09 RX ORDER — DEXAMETHASONE SODIUM PHOSPHATE 4 MG/ML
INJECTION, SOLUTION INTRA-ARTICULAR; INTRALESIONAL; INTRAMUSCULAR; INTRAVENOUS; SOFT TISSUE
Status: COMPLETED | OUTPATIENT
Start: 2023-03-09 | End: 2023-03-09

## 2023-03-09 RX ORDER — PROMETHAZINE HYDROCHLORIDE 25 MG/1
25 SUPPOSITORY RECTAL ONCE AS NEEDED
Status: DISCONTINUED | OUTPATIENT
Start: 2023-03-09 | End: 2023-03-09 | Stop reason: HOSPADM

## 2023-03-09 RX ADMIN — PHENYLEPHRINE HYDROCHLORIDE 100 MCG: 10 INJECTION, SOLUTION INTRAVENOUS at 13:06

## 2023-03-09 RX ADMIN — PHENYLEPHRINE HYDROCHLORIDE 100 MCG: 10 INJECTION, SOLUTION INTRAVENOUS at 12:20

## 2023-03-09 RX ADMIN — EPHEDRINE SULFATE 10 MG: 50 INJECTION INTRAVENOUS at 12:19

## 2023-03-09 RX ADMIN — DEXAMETHASONE SODIUM PHOSPHATE 4 MG: 4 INJECTION, SOLUTION INTRAMUSCULAR; INTRAVENOUS at 10:15

## 2023-03-09 RX ADMIN — EPHEDRINE SULFATE 10 MG: 50 INJECTION INTRAVENOUS at 12:44

## 2023-03-09 RX ADMIN — ONDANSETRON 4 MG: 2 INJECTION INTRAMUSCULAR; INTRAVENOUS at 13:07

## 2023-03-09 RX ADMIN — CEFAZOLIN SODIUM 2 G: 2 INJECTION, SOLUTION INTRAVENOUS at 11:37

## 2023-03-09 RX ADMIN — SODIUM CHLORIDE, POTASSIUM CHLORIDE, SODIUM LACTATE AND CALCIUM CHLORIDE: 600; 310; 30; 20 INJECTION, SOLUTION INTRAVENOUS at 11:47

## 2023-03-09 RX ADMIN — SUGAMMADEX 200 MG: 100 INJECTION, SOLUTION INTRAVENOUS at 13:14

## 2023-03-09 RX ADMIN — LIDOCAINE HYDROCHLORIDE 60 MG: 20 INJECTION, SOLUTION EPIDURAL; INFILTRATION; INTRACAUDAL; PERINEURAL at 11:52

## 2023-03-09 RX ADMIN — MIDAZOLAM 1 MG: 1 INJECTION INTRAMUSCULAR; INTRAVENOUS at 10:13

## 2023-03-09 RX ADMIN — TRANEXAMIC ACID 1000 MG: 1 INJECTION, SOLUTION INTRAVENOUS at 12:21

## 2023-03-09 RX ADMIN — SODIUM CHLORIDE, POTASSIUM CHLORIDE, SODIUM LACTATE AND CALCIUM CHLORIDE 9 ML/HR: 600; 310; 30; 20 INJECTION, SOLUTION INTRAVENOUS at 10:00

## 2023-03-09 RX ADMIN — ACETAMINOPHEN 975 MG: 325 TABLET, FILM COATED ORAL at 09:44

## 2023-03-09 RX ADMIN — PHENYLEPHRINE HYDROCHLORIDE 100 MCG: 10 INJECTION, SOLUTION INTRAVENOUS at 12:00

## 2023-03-09 RX ADMIN — FENTANYL CITRATE 50 MCG: 50 INJECTION, SOLUTION INTRAMUSCULAR; INTRAVENOUS at 10:13

## 2023-03-09 RX ADMIN — PHENYLEPHRINE HYDROCHLORIDE 100 MCG: 10 INJECTION, SOLUTION INTRAVENOUS at 12:54

## 2023-03-09 RX ADMIN — MELOXICAM 15 MG: 15 TABLET ORAL at 09:45

## 2023-03-09 RX ADMIN — PHENYLEPHRINE HYDROCHLORIDE 100 MCG: 10 INJECTION, SOLUTION INTRAVENOUS at 12:30

## 2023-03-09 RX ADMIN — FAMOTIDINE 20 MG: 10 INJECTION INTRAVENOUS at 10:03

## 2023-03-09 RX ADMIN — ROPIVACAINE HYDROCHLORIDE 20 ML: 5 INJECTION EPIDURAL; INFILTRATION; PERINEURAL at 10:15

## 2023-03-09 RX ADMIN — PREGABALIN 150 MG: 75 CAPSULE ORAL at 09:45

## 2023-03-09 RX ADMIN — DEXAMETHASONE SODIUM PHOSPHATE 6 MG: 4 INJECTION, SOLUTION INTRAMUSCULAR; INTRAVENOUS at 11:52

## 2023-03-09 RX ADMIN — ROCURONIUM BROMIDE 30 MG: 10 INJECTION, SOLUTION INTRAVENOUS at 11:52

## 2023-03-09 RX ADMIN — PHENYLEPHRINE HYDROCHLORIDE 100 MCG: 10 INJECTION, SOLUTION INTRAVENOUS at 13:08

## 2023-03-09 RX ADMIN — EPHEDRINE SULFATE 10 MG: 50 INJECTION INTRAVENOUS at 12:01

## 2023-03-09 RX ADMIN — VANCOMYCIN HYDROCHLORIDE 1250 MG: 10 INJECTION, POWDER, LYOPHILIZED, FOR SOLUTION INTRAVENOUS at 10:10

## 2023-03-09 RX ADMIN — PROPOFOL 150 MG: 10 INJECTION, EMULSION INTRAVENOUS at 11:52

## 2023-03-09 NOTE — THERAPY DISCHARGE NOTE
"Acute Care - Occupational Therapy Discharge  [facility]    Patient Name: Sundeep Mason  : 1940    MRN: [id number]                              Today's Date: 3/9/2023       Admit Date: 3/9/2023    Visit Dx:     ICD-10-CM ICD-9-CM   1. H/O total shoulder replacement, left  Z96.612 V43.61   2. Chronic right shoulder pain  M25.511 719.41    G89.29 338.29   3. Arthritis of shoulder  M19.019 716.91     Patient Active Problem List   Diagnosis   • S/P total knee replacement using cement   • Arthritis of right knee   • Chronic pain of right knee   • Primary osteoarthritis of right knee   • Arthritis of right knee   • Status post total right knee replacement   • Arthritis of right hip   • Chronic right shoulder pain   • Arthritis of shoulder     Past Medical History:   Diagnosis Date   • Adenocarcinoma, lung, right (HCC)     ?, RIGHT UPPER LOBE, HX RADIATION THERAPY FOR LUNG NODULES   • Anxiety and depression    • Arthritis    • Asthma    • Chronic chest pain     PT STATES HAS HAD \"PRESSURE FOR YEARS\"   • COPD (chronic obstructive pulmonary disease) (Formerly [facility])    • DDD (degenerative disc disease), cervical    • GERD (gastroesophageal reflux disease)    • Headache     HX   • 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   • Hiatal hernia    • History of 2019 novel coronavirus disease (COVID-19)    • History of kidney stones    • Hx of staphylococcal infection     AFTER LEFT TKA/PT NOT AWARE   • Hyperlipidemia    • Hypertension    • Iron deficiency anemia    • Lung nodules     TREATED WITH RADIATION THERAPY , FINISHED    • Memory changes    • Neck and shoulder pain    • Numbness or tingling     LEFT ARM   • PVD (peripheral vascular disease) (Formerly [facility])    • Right knee pain    • Shoulder pain     BILATERAL   • SOB (shortness of breath) on exertion    • Vertigo      Past Surgical History:   Procedure Laterality Date   • CATARACT EXTRACTION, BILATERAL     • CERVICAL DISCECTOMY ANTERIOR     • COLONOSCOPY     • ENDOSCOPY     • ROTATOR " CUFF REPAIR Bilateral    • TONSILLECTOMY     • TOTAL HIP ARTHROPLASTY Right    • TOTAL KNEE ARTHROPLASTY Left    • TOTAL KNEE ARTHROPLASTY Right 03/15/2017    Procedure: TOTAL KNEE ARTHROPLASTY WITH TORI NAVIGATION;  Surgeon: Lg Jaramillo MD;  Location: [facility] OR;  Service:       General Information     Row Name 03/09/23 1614          OT Time and Intention    Document Type evaluation;discharge evaluation/summary;therapy note (daily note)  -LE     Mode of Treatment occupational therapy;individual therapy  -     Row Name 03/09/23 1614          General Information    Patient Profile Reviewed yes  -LE     Prior Level of Function independent:;ADL's;transfer  -LE     Existing Precautions/Restrictions fall  Sling except hygiene and exercises. No push, pull, weight bear surgery UE. Precautions listed on HEP and review with pt and family  -LE     Row Name 03/09/23 1614          Living Environment    People in Home alone  family to stay with pt.  -     Row Name 03/09/23 1614          Cognition    Orientation Status (Cognition) oriented x 4  -LE     Row Name 03/09/23 1614          Safety Issues, Functional Mobility    Comment, Safety Issues/Impairments (Mobility) non skid socks on  -LE           User Key  (r) = Recorded By, (t) = Taken By, (c) = Cosigned By    Initials Name Provider Type    Tennille Troy OTR Occupational Therapist               Mobility/ADL's     Row Name 03/09/23 1615          Bed Mobility    Comment, (Bed Mobility) in chair with nursing when enter  -     Row Name 03/09/23 1615          Transfers    Transfers sit-stand transfer;stand-sit transfer  -     Row Name 03/09/23 1615          Sit-Stand Transfer    Sit-Stand [place] (Transfers) contact guard;nonverbal cues (demo/gesture);verbal cues  -     Row Name 03/09/23 1615          Stand-Sit Transfer    Stand-Sit [place] (Transfers) nonverbal cues (demo/gesture);verbal cues;contact guard  -[facility] Name 03/09/23 1615           Activities of Daily Living    BADL Assessment/Intervention lower body dressing;upper body dressing  -LE     Row Name 03/09/23 1615          Lower Body Dressing Assessment/Training    [place] (Lower Body Dressing) don;socks;shoes/slippers;verbal cues;nonverbal cues (demo/gesture)  -LE     Position (Lower Body Dressing) supported sitting  -LE     Comment, (Lower Body Dressing) ed one hand tech for socks.  pt still with difficulty.  discuss benefit of non skid socks.  -     Row Name 03/09/23 1615          Upper Body Dressing Assessment/Training    Position (Upper Body Dressing) supported sitting  -LE     Comment, (Upper Body Dressing) Ed thread tech to thread sleeve over surgery arm first.   Ed purpose, proper positioning and how to kinza/doff sling. Ed may need assist with task at d/c.  RN had donned shirt prior to OT. OT assist with buttoning. Sling adjusted to proper fit.  -LE           User Key  (r) = Recorded By, (t) = Taken By, (c) = Cosigned By    Initials Name Provider Type    Tennille Troy OTR Occupational Therapist               Obj/Interventions     Row Name 03/09/23 1617          Sensory Assessment (Somatosensory)    Sensory Assessment Block to surgery UE.  -     Row Name 03/09/23 1617          Range of Motion Comprehensive    Comment, General Range of Motion L UE: can wiggle fingers, bend wrist.  -     Row Name 03/09/23 1617          Motor Skills    Therapeutic Exercise --  Issue and demo shoulder HEP and return demo by pt. using non surgery UE & OT demo pendulum.   Ed complete HEP 1X10, 5-6 times a day and to hold exercises on own until block worn off & full sensation returned.  -LE           User Key  (r) = Recorded By, (t) = Taken By, (c) = Cosigned By    Initials Name Provider Type    Tennille Troy OTR Occupational Therapist               Goals/Plan     Row Name 03/09/23 1620          ROM Goal 1 (OT)    ROM Goal 1 (OT) Pt and family to return demo and verbalized understanding of  HEP for UE.  -LE     Time Frame (ROM Goal 1, OT) 1 day  -LE     Progress/Outcome (ROM Goal 1, OT) goal met  -LE     Row Name 03/09/23 1620          Problem Specific Goal 1 (OT)    Problem Specific Goal 1 (OT) Pt and family to verbalized understanding of precautions, dressing tech with immobilized UE and purpose and position of sling  -LE     Time Frame (Problem Specific Goal 1, OT) 1 day  -LE     Progress/Outcome (Problem Specific Goal 1, OT) goal met  -LE           User Key  (r) = Recorded By, (t) = Taken By, (c) = Cosigned By    Initials Name Provider Type    Tennille Troy, OTR Occupational Therapist               Clinical Impression     Row Name 03/09/23 1618          Pain Assessment    Pretreatment Pain Rating 0/10 - no pain  -LE     Row Name 03/09/23 1618          Plan of Care Review    Plan of Care Reviewed With patient;family  3 family members present who are taking turns assisting pt.  -LE     Outcome Evaluation Pt POD#0 L TSRA and seen by OT in the [facility] for UE HEP review and demo and ed/demo ADL tasks and sling use.  Pt plans to d/c home today with assist of family who are present for education.  Questions answered.  No further skilled acute care OT needs.  -LE     Row Name 03/09/23 1618          Therapy Assessment/Plan (OT)    Therapy Frequency (OT) evaluation only  -LE     Row Name 03/09/23 1618          Therapy Plan Review/Discharge Plan (OT)    Anticipated Discharge Disposition (OT) home with assist  OP PT per MD order at follow up visit  -LE     Row Name 03/09/23 1618          Vital Signs    O2 Delivery Pre Treatment room air  -LE     Pre Patient Position Sitting  -LE     Intra Patient Position Standing  -LE     Post Patient Position Sitting  -LE     Row Name 03/09/23 1618          Positioning and Restraints    Pre-Treatment Position sitting in chair/recliner  -LE     Post Treatment Position chair  -LE     In Chair notified nsg;sitting;encouraged to call for assist;with family/caregiver;LUE elevated   -LE           User Key  (r) = Recorded By, (t) = Taken By, (c) = Cosigned By    Initials Name Provider Type    Tennille Troy OTR Occupational Therapist               Outcome Measures     Row Name 03/09/23 1620          How much help from another is currently needed...    Putting on and taking off regular lower body clothing? 2  -LE     Bathing (including washing, rinsing, and drying) 3  -LE     Toileting (which includes using toilet bed pan or urinal) 3  -LE     Putting on and taking off regular upper body clothing 2  -LE     Taking care of personal grooming (such as brushing teeth) 3  -LE     Eating meals 3  -LE     AM-PAC 6 Clicks Score (OT) 16  -LE     Row Name 03/09/23 1620          Functional Assessment    Outcome Measure Options AM-PAC 6 Clicks Daily Activity (OT)  -LE           User Key  (r) = Recorded By, (t) = Taken By, (c) = Cosigned By    Initials Name Provider Type    Tennille Troy OTR Occupational Therapist              Occupational Therapy Education     Title: PT OT SLP Therapies (Done)     Topic: Occupational Therapy (Done)     Point: ADL training (Done)     Description:   Instruct learner(s) on proper safety adaptation and remediation techniques during self care or transfers.   Instruct in proper use of assistive devices.              Learning Progress Summary           Patient Acceptance, E,TB,D, Bed IU by LE at 3/9/2023 1620   Family Acceptance, E,TB,D, Bed IU by LE at 3/9/2023 1620                   Point: Home exercise program (Done)     Description:   Instruct learner(s) on appropriate technique for monitoring, assisting and/or progressing therapeutic exercises/activities.              Learning Progress Summary           Patient Acceptance, E,TB,D, Bed IU by LE at 3/9/2023 1620   Family Acceptance, E,TB,D, Bed IU by LE at 3/9/2023 1620                   Point: Precautions (Done)     Description:   Instruct learner(s) on prescribed precautions during self-care and functional transfers.               Learning Progress Summary           Patient Acceptance, E,TB,D, Bed IU by LE at 3/9/2023 1620   Family Acceptance, E,TB,D, Bed IU by LE at 3/9/2023 1620                   Point: Body mechanics (Done)     Description:   Instruct learner(s) on proper positioning and spine alignment during self-care, functional mobility activities and/or exercises.              Learning Progress Summary           Patient Acceptance, E,TB,D, Bed IU by LE at 3/9/2023 1620   Family Acceptance, E,TB,D, Bed IU by LE at 3/9/2023 1620                               User Key     Initials Effective Dates Name Provider Type Discipline     06/16/21 -  Tennille Crespo, OTR Occupational Therapist OT              OT Recommendation and Plan  Therapy Frequency (OT): evaluation only  Plan of Care Review  Plan of Care Reviewed With: patient, family (3 family members present who are taking turns assisting pt.)  Outcome Evaluation: Pt POD#0 L TSRA and seen by OT in the [facility] for UE HEP review and demo and ed/demo ADL tasks and sling use.  Pt plans to d/c home today with assist of family who are present for education.  Questions answered.  No further skilled acute care OT needs.  Plan of Care Reviewed With: patient, family (3 family members present who are taking turns assisting pt.)  Outcome Evaluation: Pt POD#0 L TSRA and seen by OT in the [facility] for UE HEP review and demo and ed/demo ADL tasks and sling use.  Pt plans to d/c home today with assist of family who are present for education.  Questions answered.  No further skilled acute care OT needs.     Time Calculation:    Time Calculation- OT     Row Name 03/09/23 1621             Time Calculation- OT    OT Start Time 1535  -LE      OT Stop Time 1603  -LE      OT Time Calculation (min) 28 min  -LE      Total Timed Code Minutes- OT 23 minute(s)  -LE      OT Received On 03/09/23  -LE         Timed Charges    25801 - OT Therapeutic Exercise Minutes 8  -LE      51304 - OT Self Care/Mgmt Minutes 15  -LE          Total Minutes    Timed Charges Total Minutes 23  -LE       Total Minutes 23  -LE            User Key  (r) = Recorded By, (t) = Taken By, (c) = Cosigned By    Initials Name Provider Type    Tennille Troy OTR Occupational Therapist              Therapy Charges for Today     Code Description Service Date Service Provider Modifiers Qty    97336501831  OT EVAL LOW COMPLEXITY 2 3/9/2023 Tennille Crespo OTR GO 1    80931866761 HC OT SELF CARE/MGMT/TRAIN EA 15 MIN 3/9/2023 Tennille Crespo OTR GO 1    [id number]  OT THER PROC EA 15 MIN 3/9/2023 Tennille Crespo OTR GO 1             OT Discharge Summary  Anticipated Discharge Disposition (OT): home with assist  Reason for Discharge: All goals achieved, At baseline function  Discharge Destination: Home with assist    HUSSEIN Gonzales  3/9/2023

## 2023-03-09 NOTE — ANESTHESIA PROCEDURE NOTES
Airway  Urgency: elective    Date/Time: 3/9/2023 11:57 AM  End Time:3/9/2023 12:04 PM    General Information and Staff    Patient location during procedure: OR  Anesthesiologist: Render, Wicho Ray, MD    Indications and Patient Condition  Indications for airway management: airway protection    Preoxygenated: yes  MILS maintained throughout  Mask difficulty assessment: 1 - vent by mask    Final Airway Details  Final airway type: endotracheal airway      Successful airway: ETT     Blade: Salgado  Blade size: 3  ETT size (mm): 7.5  Cormack-Lehane Classification: grade I - full view of glottis  Placement verified by: chest auscultation and capnometry   Number of attempts at approach: 1

## 2023-03-09 NOTE — OP NOTE
Orthopaedic Operative Note    Facility: [facility]    Patient: Sundeep Mason    Medical Record Number: [id number]    YOB: 1940    Dictating Surgeon: Navarro Guido M.D.*    Primary Care Physician: Harry Murrell MD    Date of Operation: 3/9/2023    Pre-Operative Diagnosis:  Left rotator cuff tear arthropathy    Post-Operative Diagnosis:  Left rotator cuff tear arthropathy    Procedure Performed:    Left reverse total shoulder arthroplasty      Surgeon: Navarro Guido MD     Assistant: Hoa Gonzalez whose assistance was critical for help with patient positioning, suctioning and irrigation, retraction, manipulation of the extremity for insertion of the implants, wound closure and application of the bandages.  Her assistance was critical to the success of this case.     Anesthesia: Regional followed by Gen.    Complications: None.     Estimated Blood Loss: Less than 50 mL.     Implants: 1.  Biomet size 14 micro comprehensive stem  2.  Medium augmented mini glenoid baseplate with one 6.5 mm central compression screw and 4 peripheral 4.75 mm locking screws  3.  Size 40 mm glenosphere  4.  +6 offset, standard thickness humeral tray with 40 mm standard thickness humeral bearing    Specimens: * No orders in the log *    Brief Operative Indication:  Mr. Mason had a history of worsening left shoulder pain and dysfunction which had been nonresponsive to conservative treatment.  We had a thorough discussion regarding the risks, benefits and alternatives to an arthroplasty and nonsurgical management versus surgery.  I explained that surgical risks include infection, hematoma, hardware related complications including failure of fixation, loosening, fracture, persistent pain and/or loss of motion, iatrogenic nerve and/or blood vessel injury resulting in permanent weakness, numbness or dysfunction, DVT, PE, positioning related neuropraxia, and anesthesia related complications resulting in  death.  We discussed the indication for a reverse as opposed to a standard total shoulder and the risks inherent to the reverse including notching, glenoid loosening, instability, and traction related neuropraxia, any of which could result in persistent pain or problems requiring further surgery.  Last, we discussed the rehab and all that will be expected of the patient postoperatively to ensure an optimal outcome.  The patient voiced understanding of the risks, benefits, and alternative forms of treatment that were discussed and the patient consented to proceed.    Description of the procedure in detail:  The patient and operative site were identified in the preoperative holding area.  The surgical site was marked.  Adequate regional anesthesia was administered. He was then taken to the operating room.  The patient was placed on the operating table where adequate general anesthesia was administered. He was then repositioned into the modified beachchair position with the head and neck in neutral alignment.  All bony prominences were carefully padded and protected.    The left upper extremity was then prepped and draped in the standard, sterile fashion.  The extremity was cleaned with an alcohol solution.  A Hibiclens scrub was performed and then the extremity was prepped with 2 ChloraPrep preps.  I allowed this to dry for 3 minutes before the draping procedure was carried out.    A timeout was taken and preoperative antibiotics administered.  Tranexamic acid was administered at this time as well.  Following this, I began by fashioning an approximately 6 cm incision over the anterior aspect of the shoulder.  This was carried down through the skin and subcutaneous tissues.  Full-thickness medial and lateral skin flaps were developed.  The interval between the deltoid and pectoralis was carefully identified and developed.  The underlying cephalic vein was dissected out and retracted laterally.  This structure was kept  protected throughout the case.    The clavipectoral fascia was divided.  The strap muscles were retracted medially.  The biceps groove was identified but his biceps was already torn and retracted.  He had a massive recurrent tear of the rotator cuff involving the entire supraspinatus and infraspinatus, both of which were retracted back to the level of the glenoid.  His subscapularis and the far posterior cuff appeared intact.  The subscapularis was carefully tagged and released.  I left a small cuff of tissue on the lesser tuberosity for a later anatomic repair of this structure.   The humeral head was then completely exposed.  The periarticular osteophytes were carefully removed with a rongeur.    The cutting guide for the head cut was inserted.  This was set to 20° of retroversion which I judged off of the forearm.  With the guide in position, I demarcated the cut and then carried out the cut using an oscillating saw.  The cut portion of the bone was removed and taken to the back table for possible bone grafting later in the case, if needed.  There was a metal anchor in the proximal segment which was removed at this point as well.    Having completed the humeral sided preparations, I then directed my attention to the glenoid.  The axillary nerve was carefully dissected out and identified.  This structure was protected.  Retractors were carefully positioned along the anterior, posterior and inferior glenoid rim.  I carefully exposed the caudal rim of the glenoid using the electrocautery.  The anterior, inferior and posterior glenoid labrum were then carefully debrided and removed along with the remaining biceps stump superiorly.  The centering guide for the baseplate was inserted.  The center pin for the baseplate was drilled in the center of the glenoid vault.  I then carefully reamed and prepared the glenoid in typical fashion, taking care to maintain appropriate inferior tilt for proper positioning of the  baseplate.  An augment was necessary in this case to restore proper inclination and version.    Once I had completed the reaming process and made sure that the reaming was adequate, the augmented baseplate was impacted into position.  This was secured with a single screw central compression screw which got great purchase.  The 4 peripheral locking screws were then placed without complication.  All 4 screws were confirmed to lock into the baseplate.  With the baseplate secured, I examined the remaining glenoid.  I did determine that a 40 mm glenosphere would fit best in this case.  The appropriate size implant was selected for use and then impacted.  I took care to make sure that the Wilson taper was fully engaged and that the implant was well-seated.  I used a right angle clamp to pass this beneath the implant and pull up.  When doing so, the entire scapula moved.  Once I was satisfied that this was well seated, I then directed my attention back to the humerus.    The humeral sided preparations were carried out in the typical fashion.  I reamed and broached the humerus up to a 14 micro broach which seemed to fit well.  The appropriate size implant was impacted into position, taking care to maintain appropriate retroversion as judged off of the forearm.  The implants seated well.  I then trialed off of the stem.  The +6 offset, standard thickness trial tray with a 40 mm standard thickness trial bearing seemed to fit best.  This allowed for excellent motion and stability.  The trial component was removed and then the final component impacted.  Again, I took care to make sure that the Wilson taper was fully engaged before reducing it and carrying the shoulder through range of motion.    Again, the shoulder demonstrated excellent motion and stability.  I could fully elevate, abduct and externally rotate the shoulder without any impingement or limitation.  The shoulder demonstrated excellent motion and absolutely no  instability.  There was good tension in the periarticular soft tissue structures.  At this point, I directed my attention to the subscapularis repair.  The arm was placed in approximately 30 degrees of external rotation.  The subscapularis was then anatomically repaired using multiple #2 MaxBraid sutures.  I then irrigated the wound with 500 mL of a Betadine-containing saline solution.  I then irrigated with 3 L of sterile saline via pulsatile lavage.  I made sure that we had good hemostasis.  The wound was sequentially closed in a layered fashion.  Vicryl was used to repair the subcutaneous tissues.  A running subcuticular Monocryl stitch was used to close the skin followed by Steri-strips.  Sterile dressings were applied.  The drapes were withdrawn. His arm was placed in a sling. He was awakened and taken to the recovery room in good condition.    Navarro Guido MD  03/09/23

## 2023-03-09 NOTE — PLAN OF CARE
Goal Outcome Evaluation:  Plan of Care Reviewed With: patient, family (3 family members present who are taking turns assisting pt.)           Outcome Evaluation: Pt POD#0 L TSRA and seen by OT in the [facility] for UE HEP review and demo and ed/demo ADL tasks and sling use.  Pt plans to d/c home today with assist of family who are present for education.  Questions answered.  No further skilled acute care OT needs.

## 2023-03-09 NOTE — ANESTHESIA PREPROCEDURE EVALUATION
Anesthesia Evaluation     NPO Solid Status: > 8 hours             Airway   Mallampati: II  TM distance: >3 FB  Neck ROM: full  Dental      Pulmonary    (+) lung cancer, COPD, asthma,shortness of breath,   (-) wheezes  Cardiovascular     ECG reviewed  Rhythm: regular    (+) hypertension, PVD, hyperlipidemia,       Neuro/Psych  GI/Hepatic/Renal/Endo    (+)  hiatal hernia, GERD,      Musculoskeletal     (+) neck pain,   Abdominal    Substance History      OB/GYN          Other   arthritis,    history of cancer                    Anesthesia Plan    ASA 3     general     (  D/W R&B of GA including but not limited to: heart, lung, liver, kidney, neurologic problems, positioning injuries, dental damage, corneal abrasion and TMJ.  .)  intravenous induction           CODE STATUS:

## 2023-03-09 NOTE — ANESTHESIA PROCEDURE NOTES
Peripheral Block    Pre-sedation assessment completed: 3/9/2023 10:14 AM    Patient reassessed immediately prior to procedure    Patient location during procedure: pre-op  Start time: 3/9/2023 10:15 AM  Stop time: 3/9/2023 10:20 AM  Reason for block: at surgeon's request and post-op pain management  Performed by  Anesthesiologist: Wicho Weems MD  Preanesthetic Checklist  Completed: patient identified, IV checked, site marked, risks and benefits discussed, surgical consent, monitors and equipment checked, pre-op evaluation and timeout performed  Prep:  Sterile barriers:gloves  Prep: ChloraPrep  Patient monitoring: blood pressure monitoring, continuous pulse oximetry and EKG  Procedure    Sedation: yes    Guidance:ultrasound guided    ULTRASOUND INTERPRETATION.  Using ultrasound guidance a 22 G gauge needle was placed in close proximity to the brachial plexus nerve, at which point, under ultrasound guidance anesthetic was injected in the area of the nerve and spread of the anesthesia was seen on ultrasound in close proximity thereto.  There were no abnormalities seen on ultrasound; a digital image was taken; and the patient tolerated the procedure with no complications. Images:still images obtained    Laterality:left  Block Type:interscalene  Injection Technique:single-shot  Needle Type:short-bevel  Needle Gauge:22 G      Medications Used: dexamethasone (DECADRON) injection - Injection   4 mg - 3/9/2023 10:15:00 AM  ropivacaine (NAROPIN) 0.5 % injection - Injection   20 mL - 3/9/2023 10:15:00 AM      Medications  Comment:Ultrasound Interpretation:  Using ultrasound guidance the needle was placed in close proximity to the nerve and anesthetic was injected in the area of the nerve and spread of the anesthetic was seen on ultrasound in close proximity thereto.  There were no abnormalities seen on ultrasound; a digital image was taken; and the patient tolerated the procedure with no complications.     .    Post  Assessment  Injection Assessment: negative aspiration for heme, no paresthesia on injection and incremental injection  Patient Tolerance:comfortable throughout block  Complications:no

## 2023-03-09 NOTE — ANESTHESIA POSTPROCEDURE EVALUATION
Patient: Sundeep Mason    Procedure Summary     Date: 03/09/23 Room / Location:  [facility]  /  [facility]    Anesthesia Start: 1149 Anesthesia Stop: 1338    Procedure: TOTAL SHOULDER REVERSE ARTHROPLASTY (Left: Shoulder) Diagnosis:       Chronic right shoulder pain      Arthritis of shoulder      (Chronic right shoulder pain [M25.511, G89.29])      (Arthritis of shoulder [M19.019])    Surgeons: Navarro Guido MD Provider: Wicho Weems MD    Anesthesia Type: general ASA Status: 3          Anesthesia Type: general    Vitals  Vitals Value Taken Time   /66 03/09/23 1415   Temp 36.4 °C (97.5 °F) 03/09/23 1415   Pulse 85 03/09/23 1428   Resp 16 03/09/23 1415   SpO2 94 % 03/09/23 1428   Vitals shown include unvalidated device data.        Post Anesthesia Care and Evaluation    Patient location during evaluation: bedside  Patient participation: complete - patient participated  Level of consciousness: awake  Pain management: adequate    Airway patency: patent  Anesthetic complications: No anesthetic complications  PONV Status: controlled  Cardiovascular status: acceptable  Respiratory status: acceptable  Hydration status: acceptable    Comments: ---------------------------               03/09/23                      1415         ---------------------------   BP:          132/66         Pulse:         83           Resp:          16           Temp:   36.4 °C (97.5 °F)   SpO2:          93%         ---------------------------

## 2023-03-09 NOTE — BRIEF OP NOTE
TOTAL SHOULDER REVERSE ARTHROPLASTY  Progress Note    Sundeep Mason  3/9/2023    Pre-op Diagnosis:   Chronic right shoulder pain [M25.511, G89.29]  Arthritis of shoulder [M19.019]       Post-Op Diagnosis Codes:     * Chronic right shoulder pain [M25.511, G89.29]     * Arthritis of shoulder [M19.019]    Procedure/CPT® Codes:        Procedure(s):  TOTAL SHOULDER REVERSE ARTHROPLASTY        Surgeon(s):  Navarro Guido MD    Anesthesia: General with Block    Staff:   Circulator: Naun Winter RN  Scrub Person: Sara Willoughby  Vendor Representative: Denton Patel  Assistant: Hoa Gonzalez  Assistant: Hoa Gonzalez      Estimated Blood Loss: 100ml    Urine Voided: * No values recorded between 3/9/2023 11:39 AM and 3/9/2023  1:02 PM *    Specimens:                None          Drains: * No LDAs found *    Findings: see dictation        Complications: none    Assistant: Hoa Gonzalez  was responsible for performing the following activities: Retraction and their skilled assistance was necessary for the success of this case.    Navarro Guido MD     Date: 3/9/2023  Time: 13:15 EST

## 2023-03-10 ENCOUNTER — TELEPHONE (OUTPATIENT)
Dept: ORTHOPEDIC SURGERY | Facility: CLINIC | Age: 83
End: 2023-03-10
Payer: MEDICARE

## 2023-03-10 NOTE — TELEPHONE ENCOUNTER
Postop follow-up call.  I spoke to Mr. Mason.  Reports he is doing well.  Pain is well controlled.  He says the nerve block is mostly worn off at this point.  I instructed him to postpone pendulum exercises until he has full sensation in the entire arm.  We discussed postop care instructions.  I encouraged him to call with any questions or concerns prior to his follow-up appointment in 2 weeks.  He verbalized understanding of all we discussed and appreciated the call.

## 2023-03-13 ENCOUNTER — TELEPHONE (OUTPATIENT)
Dept: ORTHOPEDIC SURGERY | Facility: HOSPITAL | Age: 83
End: 2023-03-13
Payer: MEDICARE

## 2023-03-13 ENCOUNTER — TELEPHONE (OUTPATIENT)
Dept: ORTHOPEDIC SURGERY | Facility: CLINIC | Age: 83
End: 2023-03-13
Payer: MEDICARE

## 2023-03-13 NOTE — TELEPHONE ENCOUNTER
Post op day 4  Discharge Instructions:  Ask patient about his or her discharge instructions  ?  Patient confirmed understanding   ?  Further instruction needed   What, if any, recommendations, teaching, or interventions did you provide? Click or tap here to enter text.  Health status:  Pain controlled Yes   Taking the medication and it does help.  Recommended interventions:  Yes  incision/dressing status   ?  Clean without redness, drainage, odor  ?  Redness    ?  Drainage - color Click or tap here to enter text.  ?  Odor  ALBARO - Green light blinking Choose an item.  Difficulties urination No  Last BM 3/10/2023 (if no BM by day 3-recommend OTC suppository or fleets enema)  No BM as of yet, taking a softener, gave some laxative options at this time   Medications:  ?Medications reviewed with patient/family/caregiver  Patient taking medications as prescribed?   Yes  If not taking medications as prescribed, note specific medicine(s) and reason for each:  Click or tap here to enter text.  Hospital Follow Up Plan:  Follow up Appointment with Orthopedic surgeon:  ?Has f/u appointment                ?Scheduled f/u appointment  Home Care ordered at discharge?    No         Home Care started, or contact made?    No   If no, action taken: Total Shoulder  DME obtained/used in home?         Yes   Using IS  Yes   Other information: Mr. Conn said he is doing pretty good. The block has worn off and he does have some increased pain but he is taking the medication and it does seem to help. He is doing the exercises and icing his shoulder. He starts with OP PT later this week. He hasn’t had a BM yet, options given. He voiced understanding. Mr. Conn doesn’t have any other questions/concerns for me at this time. He has my contact information should he need anything. he voiced understanding.

## 2023-03-13 NOTE — TELEPHONE ENCOUNTER
Makenna from [facility] called and is needing a referral so this pt can do PT with them.  Phone # is [phone]. Fax #[phone]

## 2023-03-20 ENCOUNTER — TELEPHONE (OUTPATIENT)
Dept: ORTHOPEDIC SURGERY | Facility: CLINIC | Age: 83
End: 2023-03-20

## 2023-03-20 ENCOUNTER — TELEPHONE (OUTPATIENT)
Dept: ORTHOPEDIC SURGERY | Facility: HOSPITAL | Age: 83
End: 2023-03-20
Payer: MEDICARE

## 2023-03-20 NOTE — TELEPHONE ENCOUNTER
Attempted to reach Mr. Conn to see how he is doing as he is 2 weeks SP LTSRA. Mailbox is not set up. Unable to leave message at this time.

## 2023-03-20 NOTE — TELEPHONE ENCOUNTER
Provider: DR GAY     Caller: KELLY BELTRAN     Relationship to Patient: SELF     Phone Number: [phone]    Reason for Call: PATIENT WOULD LIKE TO SPEAK TO SOMEONE PLEASE ADVISE

## 2023-03-23 DIAGNOSIS — Z96.612 H/O TOTAL SHOULDER REPLACEMENT, LEFT: ICD-10-CM

## 2023-03-23 RX ORDER — HYDROCODONE BITARTRATE AND ACETAMINOPHEN 7.5; 325 MG/1; MG/1
TABLET ORAL
Qty: 42 TABLET | Refills: 0 | Status: SHIPPED | OUTPATIENT
Start: 2023-03-23

## 2023-03-27 ENCOUNTER — OFFICE VISIT (OUTPATIENT)
Dept: ORTHOPEDIC SURGERY | Facility: CLINIC | Age: 83
End: 2023-03-27
Payer: MEDICARE

## 2023-03-27 VITALS — HEIGHT: 68 IN | BODY MASS INDEX: 25.52 KG/M2 | TEMPERATURE: 97.4 F | WEIGHT: 168.4 LBS

## 2023-03-27 DIAGNOSIS — Z96.612 H/O TOTAL SHOULDER REPLACEMENT, LEFT: Primary | ICD-10-CM

## 2023-03-27 DIAGNOSIS — Z09 SURGERY FOLLOW-UP: ICD-10-CM

## 2023-03-27 PROCEDURE — 73030 X-RAY EXAM OF SHOULDER: CPT | Performed by: ORTHOPAEDIC SURGERY

## 2023-03-27 PROCEDURE — 1160F RVW MEDS BY RX/DR IN RCRD: CPT | Performed by: ORTHOPAEDIC SURGERY

## 2023-03-27 PROCEDURE — 99024 POSTOP FOLLOW-UP VISIT: CPT | Performed by: ORTHOPAEDIC SURGERY

## 2023-03-27 PROCEDURE — 1159F MED LIST DOCD IN RCRD: CPT | Performed by: ORTHOPAEDIC SURGERY

## 2023-03-27 NOTE — PROGRESS NOTES
"Sundeep Mason : 1940 MRN: [id number] DATE: 3/27/2023    DIAGNOSIS:  2 week follow up left shoulder arthroplasty      SUBJECTIVE:  Patient returns today for 2 week follow up of left shoulder replacement. Patient reports doing well with no unusual complaints.      OBJECTIVE:    Temp 97.4 °F (36.3 °C) (Temporal)   Ht 172.7 cm (68\")   Wt 76.4 kg (168 lb 6.4 oz)   BMI 25.61 kg/m²     Exam:  The incision is well approximated.  No erythema or drainage. Shoulder moves fluidly with pendulums.  The arm is soft and nontender.  Intact motor and sensory function in the lower arm and hand.  Palpable radial pulse.    DIAGNOSTIC STUDIES    Xrays: AP and scapular Y views of the left shoulder are ordered and reviewed for evaluation of the recent shoulder replacement.  The x-rays demonstrate a well positioned, well aligned replacement without complicating factors noted.  In comparison with previous films, there has been no change.    ASSESSMENT: 2 week follow up left shoulder replacement.    PLAN:   1.  PT per protocol  2.  Continue sling until next visit.  3.  Counseled patient about appropriate activity modifications and restrictions, including no driving at this point.    Navarro Guido MD    "

## 2023-04-10 DIAGNOSIS — Z96.612 H/O TOTAL SHOULDER REPLACEMENT, LEFT: ICD-10-CM

## 2023-04-10 RX ORDER — HYDROCODONE BITARTRATE AND ACETAMINOPHEN 7.5; 325 MG/1; MG/1
TABLET ORAL
Qty: 42 TABLET | Refills: 0 | Status: SHIPPED | OUTPATIENT
Start: 2023-04-10

## 2023-04-10 NOTE — TELEPHONE ENCOUNTER
Caller: Sundeep Mason    Relationship: Self    Best call back number:   Requested Prescriptions:   HYDROcodone-acetaminophen (NORCO) 7.5-325 MG per tablet        Pharmacy where request should be sent:  [address]     Last office visit with prescribing clinician: 3/27/2023   Last telemedicine visit with prescribing clinician: 4/21/2023   Next office visit with prescribing clinician: Visit date not found     Additional details provided by patient: HAS 1 OR 2 ,EFT     Does the patient have less than a 3 day supply:  [x] Yes  [] No    Would you like a call back once the refill request has been completed: [x] Yes [] No    If the office needs to give you a call back, can they leave a voicemail: [x] Yes [] No    Sally Eric Rep   04/10/23 10:46 EDT

## 2023-04-21 ENCOUNTER — OFFICE VISIT (OUTPATIENT)
Dept: ORTHOPEDIC SURGERY | Facility: CLINIC | Age: 83
End: 2023-04-21
Payer: MEDICARE

## 2023-04-21 VITALS — BODY MASS INDEX: 26.3 KG/M2 | WEIGHT: 167.6 LBS | HEIGHT: 67 IN | TEMPERATURE: 97.5 F

## 2023-04-21 DIAGNOSIS — Z09 SURGERY FOLLOW-UP: Primary | ICD-10-CM

## 2023-04-21 NOTE — PROGRESS NOTES
"Sundeep Mason : 1940 MRN: [id number] DATE: 2023    DIAGNOSIS: 6 week follow up left shoulder arthroplasty      SUBJECTIVE:  Patient returns today for 6 week follow up of left shoulder replacement. Patient reports doing well with no unusual complaints.     OBJECTIVE:    Temp 97.5 °F (36.4 °C)   Ht 170.2 cm (67\")   Wt 76 kg (167 lb 9.6 oz)   BMI 26.25 kg/m²     Exam: The incision is well healed. No erythema or drainage. Shoulder moves fluidly with pendulums.  Motion is on track per protocol.  The arm is soft and nontender.  Good motor and sensory function.  Palpable distal pulses.     DIAGNOSTIC STUDIES    Xrays: AP and scapular Y views of the left shoulder are ordered and reviewed for evaluation of shoulder replacement.  They demonstrate a well positioned, well aligned replacement without complicating factors noted.  In comparison with previous films there has been no change.    ASSESSMENT: 6 week follow up left shoulder replacement    PLAN:   1.  Continue PT per protocol.  2.  Discontinue sling and begin working on progressing ROM as tolerated.  3.  Counseled patient about appropriate activity modifications and restrictions.  Released to drive at this point.  4.  We discussed the need for prophylactic antibiotics prior to dental appointments.  5.  Follow-up in 6 weeks with Dr. Guido.      Danya Estes, APRN    2023     "

## 2023-05-04 DIAGNOSIS — Z96.612 H/O TOTAL SHOULDER REPLACEMENT, LEFT: ICD-10-CM

## 2023-05-04 NOTE — TELEPHONE ENCOUNTER
Relationship: PATIENT     Best call back number: [phone]    Requested Prescriptions:   HYDROCODONE 7.5-325 MG.      Pharmacy where request should be sent: [facility] [phone]     Last office visit with prescribing clinician: 3/27/2023   Last telemedicine visit with prescribing clinician: 6/2/2023   Next office visit with prescribing clinician: 6/2/2023       Does the patient have less than a 3 day supply:  [x] Yes  [] No    Would you like a call back once the refill request has been completed: [x] Yes [] No    If the office needs to give you a call back, can they leave a voicemail: [x] Yes [] No

## 2023-05-05 DIAGNOSIS — Z96.612 H/O TOTAL SHOULDER REPLACEMENT, LEFT: ICD-10-CM

## 2023-05-05 RX ORDER — HYDROCODONE BITARTRATE AND ACETAMINOPHEN 7.5; 325 MG/1; MG/1
TABLET ORAL
Qty: 30 TABLET | Refills: 0 | Status: SHIPPED | OUTPATIENT
Start: 2023-05-05

## 2023-05-05 RX ORDER — HYDROCODONE BITARTRATE AND ACETAMINOPHEN 7.5; 325 MG/1; MG/1
TABLET ORAL
Qty: 42 TABLET | Refills: 0 | Status: SHIPPED | OUTPATIENT
Start: 2023-05-05 | End: 2023-05-05 | Stop reason: SDUPTHER

## 2023-06-02 ENCOUNTER — OFFICE VISIT (OUTPATIENT)
Dept: ORTHOPEDIC SURGERY | Facility: CLINIC | Age: 83
End: 2023-06-02

## 2023-06-02 VITALS — BODY MASS INDEX: 25.87 KG/M2 | TEMPERATURE: 97.5 F | WEIGHT: 164.8 LBS | HEIGHT: 67 IN

## 2023-06-02 DIAGNOSIS — R52 PAIN: Primary | ICD-10-CM

## 2023-06-02 DIAGNOSIS — Z09 SURGERY FOLLOW-UP: ICD-10-CM

## 2023-06-02 DIAGNOSIS — M19.019 ARTHRITIS OF SHOULDER: ICD-10-CM

## 2023-06-02 RX ORDER — AMOXICILLIN AND CLAVULANATE POTASSIUM 875; 125 MG/1; MG/1
1 TABLET, FILM COATED ORAL EVERY 12 HOURS SCHEDULED
COMMUNITY
Start: 2023-05-04

## 2023-06-02 RX ORDER — LIDOCAINE HYDROCHLORIDE 10 MG/ML
2 INJECTION, SOLUTION EPIDURAL; INFILTRATION; INTRACAUDAL; PERINEURAL
Status: COMPLETED | OUTPATIENT
Start: 2023-06-02 | End: 2023-06-02

## 2023-06-02 RX ORDER — METHYLPREDNISOLONE ACETATE 80 MG/ML
80 INJECTION, SUSPENSION INTRA-ARTICULAR; INTRALESIONAL; INTRAMUSCULAR; SOFT TISSUE
Status: COMPLETED | OUTPATIENT
Start: 2023-06-02 | End: 2023-06-02

## 2023-06-02 RX ORDER — LEVOFLOXACIN 250 MG/1
TABLET ORAL
COMMUNITY
Start: 2023-04-26

## 2023-06-02 RX ADMIN — LIDOCAINE HYDROCHLORIDE 2 ML: 10 INJECTION, SOLUTION EPIDURAL; INFILTRATION; INTRACAUDAL; PERINEURAL at 10:25

## 2023-06-02 RX ADMIN — METHYLPREDNISOLONE ACETATE 80 MG: 80 INJECTION, SUSPENSION INTRA-ARTICULAR; INTRALESIONAL; INTRAMUSCULAR; SOFT TISSUE at 10:25

## 2023-06-02 NOTE — PROGRESS NOTES
"Sundeep Mason : 1940 MRN: [id number] DATE: 2023    DIAGNOSIS: 3 month follow up left shoulder arthroplasty, follow-up right shoulder pain    SUBJECTIVE:  Mr. Mason returns today for 3 month follow up of left shoulder replacement.  He reports the left shoulder is doing okay but not great.  He still has some pain, particularly over the top of his shoulder.  He was having similar pain in that area even prior to the replacement.  He reports good motion and overall good function though.  Biggest complaint today is actually the right shoulder.  He feels like he is overusing it.  We have injected in the past and the injection really helped.  OBJECTIVE:    Temp 97.5 °F (36.4 °C) (Temporal)   Ht 170.2 cm (67.01\")   Wt 74.8 kg (164 lb 12.8 oz)   BMI 25.81 kg/m²     Review of Systems negative except as above    Left shoulder: The incision is well healed. No effusion.  Mild to moderate tenderness over the acromioclavicular joint and suprascapular fossa.  No palpable swellings or masses.  Range of motion:  .  ER 50.  IR to his back pocket.   5-/5 strength with elevation and abduction.  Sensation intact distally.  Brisk cap refill.    Right shoulder: Skin is benign.  Trace bursal effusion.  Mild anterior tenderness.  Good motion.    DIAGNOSTIC STUDIES    Xrays: AP, scapular Y and axillary views of the left shoulder are ordered and reviewed for evaluation of shoulder replacement. They demonstrate a well positioned, well aligned replacement without complicating factors noted.  In comparison with previous films there has been no change.    ASSESSMENT: 1.  3 month follow up left shoulder replacement  2.  Right rotator cuff tear arthropathy    PLAN:   1.  Continue appropriate activity modifications and restrictions for the left shoulder as discussed  2.  Continue PT per protocol.  3.  I explained that one can expect continued improvement in motion, function, and any residual discomfort for up to 1 year after " surgery.  I expect he should continue to get better.  4.  Follow up in 6 months for reevaluation  5.  Antibiotic prophylaxis discussed  6.  I did agree to perform an injection for his right shoulder today.  The risk, benefits and alternatives were discussed.  He consented and the procedure was performed as described below.    Navarro Guido MD    06/02/2023     Large Joint Arthrocentesis: R subacromial bursa  Date/Time: 6/2/2023 10:25 AM  Consent given by: patient  Site marked: site marked  Timeout: Immediately prior to procedure a time out was called to verify the correct patient, procedure, equipment, support staff and site/side marked as required   Supporting Documentation  Indications: pain and joint swelling   Procedure Details  Location: shoulder - R subacromial bursa  Preparation: Patient was prepped and draped in the usual sterile fashion  Needle gauge: 21 G.  Approach: posterior  Medications administered: 80 mg methylPREDNISolone acetate 80 MG/ML; 2 mL lidocaine PF 1% 1 %  Patient tolerance: patient tolerated the procedure well with no immediate complications

## 2023-09-29 ENCOUNTER — OFFICE VISIT (OUTPATIENT)
Dept: ORTHOPEDIC SURGERY | Facility: CLINIC | Age: 83
End: 2023-09-29
Payer: MEDICARE

## 2023-09-29 VITALS — BODY MASS INDEX: 25.51 KG/M2 | HEIGHT: 68 IN | WEIGHT: 168.3 LBS | TEMPERATURE: 97.8 F

## 2023-09-29 DIAGNOSIS — M54.2 NECK PAIN: ICD-10-CM

## 2023-09-29 DIAGNOSIS — R52 PAIN: Primary | ICD-10-CM

## 2023-09-29 DIAGNOSIS — Z96.612 H/O TOTAL SHOULDER REPLACEMENT, LEFT: ICD-10-CM

## 2023-09-29 DIAGNOSIS — M19.019 ARTHRITIS OF SHOULDER: ICD-10-CM

## 2023-09-29 RX ORDER — LIDOCAINE HYDROCHLORIDE 10 MG/ML
2 INJECTION, SOLUTION EPIDURAL; INFILTRATION; INTRACAUDAL; PERINEURAL
Status: COMPLETED | OUTPATIENT
Start: 2023-09-29 | End: 2023-09-29

## 2023-09-29 RX ORDER — METHYLPREDNISOLONE ACETATE 80 MG/ML
80 INJECTION, SUSPENSION INTRA-ARTICULAR; INTRALESIONAL; INTRAMUSCULAR; SOFT TISSUE
Status: COMPLETED | OUTPATIENT
Start: 2023-09-29 | End: 2023-09-29

## 2023-09-29 RX ORDER — LEVOTHYROXINE SODIUM 0.03 MG/1
25 TABLET ORAL DAILY
Qty: 30 TABLET | Refills: 11 | COMMUNITY
Start: 2023-09-26 | End: 2024-09-25

## 2023-09-29 RX ADMIN — LIDOCAINE HYDROCHLORIDE 2 ML: 10 INJECTION, SOLUTION EPIDURAL; INFILTRATION; INTRACAUDAL; PERINEURAL at 15:06

## 2023-09-29 RX ADMIN — METHYLPREDNISOLONE ACETATE 80 MG: 80 INJECTION, SUSPENSION INTRA-ARTICULAR; INTRALESIONAL; INTRAMUSCULAR; SOFT TISSUE at 15:06

## 2023-09-29 NOTE — PROGRESS NOTES
Chief Complaint:  Follow up left shoulder replacement, right rotator cuff tear arthropathy    HPI:  Mr. Mason comes in today for follow-up of his left shoulder.  He is now roughly 6 months out from the reverse total shoulder arthroplasty.  He says his shoulder was doing great up until about a month ago when he fell.  He has been having some intermittent discomfort when he reaches or lifts ever since the fall.  He does admit that it is getting better.  Overall, he says he is really happy with the left shoulder.  Unfortunately, he is still having a lot of discomfort in the right shoulder.  The last injection helped.  He would like to try that again today.    Exam: Left shoulder is examined.  Skin is benign.  Surgical incision is healed.  No effusion.  No bony tenderness or step-offs.  He has good motion except for internal rotation which is limited to roughly his back pocket.  Abduction is just a little uncomfortable for him but his strength is overall very good.  He did not have any significant pain on examination of the left shoulder today.    Right shoulder is just briefly examined.  Skin is benign.  Mild anterior tenderness.  He can still raise the arm all the way overhead.    Imaging: AP, scapular Y and axial views left shoulder are ordered and reviewed to evaluate his implants.  These are compared to previous x-rays.  No concerning findings.  Implants look fine.  I do not see any acute abnormalities.    Assessment: 1.  Follow-up now 6-month status post left reverse total shoulder arthroplasty 2.  Right rotator cuff tear arthropathy    Plan: His left shoulder seems to be doing fine.  I think he may have strained his deltoid in the fall but it seems to be improving.  I recommend expectant management.  Appropriate activity modifications and restrictions were discussed.  Antibiotic prophylaxis recommendations were discussed as well.  I would like him to follow-up in another 6 months.  I told him to call me if he has  any concerns or problems before then.    I did agree to repeat the injection for his right shoulder today.  Risk were discussed and he consented.  The procedure was performed as described below.    Navarro Guiod MD  09/29/2023    Large Joint Arthrocentesis: R subacromial bursa  Date/Time: 9/29/2023 3:06 PM  Consent given by: patient  Site marked: site marked  Timeout: Immediately prior to procedure a time out was called to verify the correct patient, procedure, equipment, support staff and site/side marked as required   Supporting Documentation  Indications: pain   Procedure Details  Location: shoulder - R subacromial bursa  Preparation: Patient was prepped and draped in the usual sterile fashion  Needle gauge: 21G.  Approach: posterior  Medications administered: 80 mg methylPREDNISolone acetate 80 MG/ML; 2 mL lidocaine PF 1% 1 %  Patient tolerance: patient tolerated the procedure well with no immediate complications

## 2023-10-09 ENCOUNTER — TELEPHONE (OUTPATIENT)
Dept: ORTHOPEDIC SURGERY | Facility: CLINIC | Age: 83
End: 2023-10-09
Payer: MEDICARE

## 2023-10-09 NOTE — TELEPHONE ENCOUNTER
CALLED PATIENT THIS IS W/C HE IS TRING TO GET THIS FIGURED OUT BEFORE HE MAKES APPT WITH KAT[facility]

## 2023-10-09 NOTE — TELEPHONE ENCOUNTER
"    Caller: Sundeep Mason    Relationship to patient: Self    Best call back number:  [phone]    Patient is needing: PATIENT WOULD LIKE A CALL BACK FROM \"AYE\" ABOUT HIS INSURANCE AND  SCHEDULING AN APPT WITH KATARZY[facility]     "

## 2023-11-06 RX ORDER — TRAMADOL HYDROCHLORIDE 50 MG/1
50 TABLET ORAL EVERY 4 HOURS PRN
Qty: 50 TABLET | Refills: 0 | Status: SHIPPED | OUTPATIENT
Start: 2023-11-06

## 2024-04-26 ENCOUNTER — OFFICE VISIT (OUTPATIENT)
Dept: ORTHOPEDIC SURGERY | Facility: CLINIC | Age: 84
End: 2024-04-26
Payer: MEDICARE

## 2024-04-26 VITALS — BODY MASS INDEX: 26.07 KG/M2 | HEIGHT: 68 IN | TEMPERATURE: 98.6 F | WEIGHT: 172 LBS

## 2024-04-26 DIAGNOSIS — R52 PAIN: Primary | ICD-10-CM

## 2024-04-26 DIAGNOSIS — M19.019 ARTHRITIS OF SHOULDER: ICD-10-CM

## 2024-04-26 DIAGNOSIS — Z09 SURGERY FOLLOW-UP: ICD-10-CM

## 2024-04-26 RX ORDER — PREGABALIN 150 MG/1
150 CAPSULE ORAL ONCE
OUTPATIENT
Start: 2024-04-26 | End: 2024-04-26

## 2024-04-26 RX ORDER — METHYLPREDNISOLONE ACETATE 80 MG/ML
80 INJECTION, SUSPENSION INTRA-ARTICULAR; INTRALESIONAL; INTRAMUSCULAR; SOFT TISSUE
Status: COMPLETED | OUTPATIENT
Start: 2024-04-26 | End: 2024-04-26

## 2024-04-26 RX ORDER — MELOXICAM 7.5 MG/1
15 TABLET ORAL ONCE
OUTPATIENT
Start: 2024-04-26 | End: 2024-04-26

## 2024-04-26 RX ORDER — LIDOCAINE HYDROCHLORIDE 10 MG/ML
2 INJECTION, SOLUTION EPIDURAL; INFILTRATION; INTRACAUDAL; PERINEURAL
Status: COMPLETED | OUTPATIENT
Start: 2024-04-26 | End: 2024-04-26

## 2024-04-26 RX ORDER — ACETAMINOPHEN 325 MG/1
1000 TABLET ORAL ONCE
OUTPATIENT
Start: 2024-04-26 | End: 2024-04-26

## 2024-04-26 RX ADMIN — LIDOCAINE HYDROCHLORIDE 2 ML: 10 INJECTION, SOLUTION EPIDURAL; INFILTRATION; INTRACAUDAL; PERINEURAL at 14:17

## 2024-04-26 RX ADMIN — METHYLPREDNISOLONE ACETATE 80 MG: 80 INJECTION, SUSPENSION INTRA-ARTICULAR; INTRALESIONAL; INTRAMUSCULAR; SOFT TISSUE at 14:17

## 2024-04-26 NOTE — PROGRESS NOTES
Patient: Sundeep Mason    YOB: 1940    Medical Record Number: [id number]    Chief Complaints: Worsening right shoulder pain and dysfunction,, annual follow-up for left reverse total shoulder arthroplasty    History of Present Illness:     83 y.o. male patient who presents for follow-up of the right shoulder. He reports progressively worsening pain and dysfunction.  Conservative treatment has been ineffective thus far.  The last injection did not help nearly as much. He reports difficulty performing daily activities and has expressed interest in pursuing an arthroplasty.  He is hoping to do that sometime around the end of the summer.  He would like to get a repeat injection today as well.    He also follows up for his left shoulder.  He says the left shoulder is overall okay.  He reports that the shoulder surgery was a big success from a functional standpoint and he reports that he is pretty much able to do everything that he wants to do on a daily basis.  He has some discomfort on the top of the shoulder that extends up into his neck.  He thinks it may be coming from his cervical spine.  He was referred to a neck specialist.  They have referred him to PT.  He says they had also discussed possibly referring him for epidural steroid injections.    Allergies:   Allergies   Allergen Reactions    Iodine Hives     UNSURE WHAT TYPE OF DYE PER PT, SEVERAL YEARS AGO (AGE 20'S)  LARGE WEL       Home Medications:    Current Outpatient Medications:     acetaminophen (TYLENOL) 325 MG tablet, Take 2 tablets by mouth 2 (Two) Times a Day As Needed for Mild Pain., Disp: 60 tablet, Rfl: 0    amLODIPine (NORVASC) 5 MG tablet, Take 1 tablet by mouth Every Morning., Disp: , Rfl:     aspirin 81 MG EC tablet, Take 1 tablet by mouth Daily. TO HOLD 1 WEEK BEFORE SURGERY PER MD INSTRUCTIONS, Disp: , Rfl:     atorvastatin (LIPITOR) 20 MG tablet, Take 1 tablet by mouth Daily., Disp: , Rfl:     famotidine (PEPCID) 20 MG  tablet, Take 1 tablet by mouth 2 (Two) Times a Day., Disp: , Rfl:     Fluticasone-Umeclidin-Vilant (Trelegy Ellipta) 100-62.5-25 MCG/ACT inhaler, Inhale 1 puff Daily., Disp: , Rfl:     losartan (COZAAR) 100 MG tablet, Take 1 tablet by mouth Every Morning., Disp: , Rfl:     montelukast (SINGULAIR) 10 MG tablet, Take 1 tablet by mouth Daily., Disp: , Rfl:     amoxicillin-clavulanate (AUGMENTIN) 875-125 MG per tablet, Take 1 tablet by mouth Every 12 (Twelve) Hours. (Patient not taking: Reported on 4/26/2024), Disp: , Rfl:     docusate sodium (COLACE) 100 MG capsule, Take 1 capsule by mouth 2 (Two) Times a Day. (Patient not taking: Reported on 4/26/2024), Disp: 60 capsule, Rfl: 0    HYDROcodone-acetaminophen (NORCO) 7.5-325 MG per tablet, Take 1 tablet by mouth every 8 hours as needed for pain (Patient not taking: Reported on 4/26/2024), Disp: 30 tablet, Rfl: 0    levoFLOXacin (LEVAQUIN) 250 MG tablet, , Disp: , Rfl:     levothyroxine (SYNTHROID, LEVOTHROID) 25 MCG tablet, Take 1 tablet by mouth Daily. (Patient not taking: Reported on 4/26/2024), Disp: 30 tablet, Rfl: 11    meclizine (ANTIVERT) 12.5 MG tablet, Take 1 tablet by mouth 3 (Three) Times a Day As Needed for Dizziness. (Patient not taking: Reported on 4/26/2024), Disp: , Rfl:     ondansetron (Zofran) 4 MG tablet, Take 1 tablet by mouth Every 8 (Eight) Hours As Needed for Nausea or Vomiting. (Patient not taking: Reported on 4/26/2024), Disp: 12 tablet, Rfl: 0    traMADol (ULTRAM) 50 MG tablet, Take 1 tablet by mouth Every 4 (Four) Hours As Needed for Moderate Pain. (Patient not taking: Reported on 4/26/2024), Disp: 60 tablet, Rfl: 0    traMADol (ULTRAM) 50 MG tablet, TAKE 1 TABLET BY MOUTH EVERY 4 HOURS AS NEEDED FOR MODERATE PAIN (Patient not taking: Reported on 4/26/2024), Disp: 50 tablet, Rfl: 0    traZODone (DESYREL) 100 MG tablet, Take 100 mg by mouth At Night As Needed., Disp: , Rfl:     traZODone (DESYREL) 100 MG tablet, Take 0.5-1 tablets by mouth Every  "Night., Disp: , Rfl:     Past Medical History:   Diagnosis Date    Adenocarcinoma, lung, right     ?, RIGHT UPPER LOBE, HX RADIATION THERAPY FOR LUNG NODULES    Anxiety and depression     Arthritis     Asthma     Chronic chest pain     PT STATES HAS HAD \"PRESSURE FOR YEARS\"    COPD (chronic obstructive pulmonary disease)     DDD (degenerative disc disease), cervical     GERD (gastroesophageal reflux disease)     Headache     HX    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    Hiatal hernia     History of 2019 novel coronavirus disease (COVID-19)     History of kidney stones     Hx of staphylococcal infection     AFTER LEFT TKA/PT NOT AWARE    Hyperlipidemia     Hypertension     Iron deficiency anemia     Lung nodules     TREATED WITH RADIATION THERAPY , FINISHED     Memory changes     Neck and shoulder pain     Numbness or tingling     LEFT ARM    PVD (peripheral vascular disease)     Right knee pain     Shoulder pain     BILATERAL    SOB (shortness of breath) on exertion     Vertigo        Past Surgical History:   Procedure Laterality Date    CATARACT EXTRACTION, BILATERAL      CERVICAL DISCECTOMY ANTERIOR      COLONOSCOPY      ENDOSCOPY      ROTATOR CUFF REPAIR Bilateral     TONSILLECTOMY      TOTAL HIP ARTHROPLASTY Right     TOTAL KNEE ARTHROPLASTY Left     TOTAL KNEE ARTHROPLASTY Right 03/15/2017    Procedure: TOTAL KNEE ARTHROPLASTY WITH TORI NAVIGATION;  Surgeon: Lg Jaramillo MD;  Location: [facility] OR;  Service:     TOTAL SHOULDER ARTHROPLASTY W/ DISTAL CLAVICLE EXCISION Left 3/9/2023    Procedure: TOTAL SHOULDER REVERSE ARTHROPLASTY;  Surgeon: Navarro Guido MD;  Location: [facility];  Service: Orthopedics;  Laterality: Left;       Social History     Occupational History    Not on file   Tobacco Use    Smoking status: Former     Current packs/day: 0.00     Types: Cigarettes     Quit date: 2001     Years since quittin.8     Passive exposure: Past    Smokeless tobacco: Never   Vaping Use " "   Vaping status: Never Used   Substance and Sexual Activity    Alcohol use: Yes     Comment: RARE USE    Drug use: No    Sexual activity: Defer      Social History     Social History Narrative    Not on file       Family History   Problem Relation Age of Onset    Hypertension Other     Heart disease Other     Diabetes Other     Malig Hyperthermia Neg Hx        Review of Systems:      Constitutional: Denies fever, shaking or chills   Eyes: Denies change in visual acuity   HEENT: Denies nasal congestion or sore throat   Respiratory: Denies cough or shortness of breath   Cardiovascular: Denies chest pain or edema  Endocrine: Denies tremors, palpitations, intolerance of heat or cold, polyuria, polydipsia.  GI: Denies abdominal pain, nausea, vomiting, bloody stools or diarrhea  : Denies frequency, urgency, incontinence, retention, or nocturia.  Musculoskeletal: Denies numbness, tingling or loss of motor function except as above  Integument: Denies rash, lesion or ulceration   Neurologic: Denies headache or focal weakness, deficits  Heme: Denies spontaneous or excessive bleeding, epistaxis, hematuria, melena, fatigue, enlarged or tender lymph nodes.      All other pertinent positives and negatives as noted above in HPI.    Physical Exam:   83 y.o. male  Vitals:    04/26/24 1405   Temp: 98.6 °F (37 °C)   Weight: 78 kg (172 lb)   Height: 171.5 cm (67.52\")     General:  Patient is awake and alert.  Appears in no acute distress or discomfort.    Psych:  Affect and demeanor are appropriate.    Cardiovascular:  Regular rate and rhythm.    Lungs:  Good chest expansion.  Breathing unlabored.    Extremities:  Right shoulder is examined.  Skin is benign.  Moderate anterior tenderness.  Passive motion remains good.  He can maintain his arm in an elevated position when I raise it.  Active motion is limited and uncomfortable.  Negative Hornblower's.  Intact deltoid function and axillary nerve sensation.  Normal motor and sensory " function in the lower arm and hand.  Palpable radial pulse.  Brisk capillary refill.    Imaging: Previous x-rays of the right shoulder from [facility] in 2022 are reviewed and show end-stage osteoarthritis.  AP, scapular Y and axillary views left shoulder are ordered and reviewed evaluate his implants.  These compared to previous x-rays.  X-rays show no significant changes or concerning findings.    Assessment/Plan:  1.  End-stage right glenohumeral arthritis 2.  Annual follow-up for left reverse total shoulder arthroplasty    We discussed surgical and nonsurgical treatment options for his right shoulder.  He had a good understanding of this information based on our previous discussions.  The risk, benefits and alternatives to a reverse total shoulder arthroplasty were carefully discussed.  All questions posed by the patient were answered in detail. He wants to move forward with an arthroplasty.  We will look at getting this scheduled.  I will have him follow-up with either myself or Danya for a preoperative visit.  I did agree to repeat the injection of his right shoulder today as well.  The risk, benefits and alternatives were discussed.  He consented.  He will need repeat x-rays of the right shoulder including AP, scapular Y and axillary views at the time of his next evaluation.    Left shoulder seems to be doing fine.  I told him that if the pain in his left shoulder continues to be a problem despite the PT and epidurals then I would like to see him back and better evaluate that issue.  Appropriate activity modifications and restrictions were discussed.  Antibiotic prophylaxis recommendations were discussed as well.  I am okay with him following up for the left shoulder as needed.      Large Joint Arthrocentesis: R subacromial bursa  Date/Time: 4/26/2024 2:17 PM  Consent given by: patient  Site marked: site marked  Timeout: Immediately prior to procedure a time out was called to verify the correct patient,  procedure, equipment, support staff and site/side marked as required   Supporting Documentation  Indications: pain   Procedure Details  Location: shoulder - R subacromial bursa  Preparation: Patient was prepped and draped in the usual sterile fashion  Needle gauge: 21 G.  Approach: posterior  Medications administered: 2 mL lidocaine PF 1% 1 %; 80 mg methylPREDNISolone acetate 80 MG/ML  Patient tolerance: patient tolerated the procedure well with no immediate complications        Navarro Guido MD  04/26/2024

## 2025-05-09 ENCOUNTER — OFFICE VISIT (OUTPATIENT)
Dept: ORTHOPEDIC SURGERY | Facility: CLINIC | Age: 85
End: 2025-05-09
Payer: MEDICARE

## 2025-05-09 VITALS — BODY MASS INDEX: 25.08 KG/M2 | HEIGHT: 67 IN | TEMPERATURE: 98.6 F | WEIGHT: 159.8 LBS

## 2025-05-09 DIAGNOSIS — M19.011 ARTHRITIS OF RIGHT SHOULDER: ICD-10-CM

## 2025-05-09 DIAGNOSIS — R52 PAIN: Primary | ICD-10-CM

## 2025-05-09 DIAGNOSIS — Z09 SURGERY FOLLOW-UP: ICD-10-CM

## 2025-05-09 PROCEDURE — 99213 OFFICE O/P EST LOW 20 MIN: CPT | Performed by: ORTHOPAEDIC SURGERY

## 2025-05-09 NOTE — PROGRESS NOTES
CC:  Follow up bilateral shoulders    Mr. Mason seen today in follow-up for both shoulders.  Overall, he says the left shoulder is actually really good.  He is now 2 years out from the replacement.  He is happy with his outcome.  He gets occasional discomfort over the top of the shoulder, particularly if he sleeps on his left side.  Otherwise, he denies any pain in the shoulder.  He reports severe pain in the right shoulder with very limited movement and significant dysfunction.  We have done injections in the past and they did help transiently.    Left shoulder: Surgical incision is healed.  Moderate tenderness over the acromioclavicular joint.  No effusion or increased warmth.  His motion is excellent.  Good strength with abduction and forward elevation.  Positive active compression maneuver.    Right shoulder: Skin is benign.  Moderate tenderness anteriorly.  No effusion.  Active motion is limited and painful.  He has crepitus with range of motion.    AP, scapular Y and axillary views of both shoulders are ordered and reviewed.  These compared to previous x-rays.  His left shoulder implants look fine.  He does appear to have sequela of a previous distal clavicle excision.  I do not see any complicating process.  His right shoulder x-rays show end-stage glenohumeral osteoarthritis with bone-on-bone and glenoid erosion.    Assessment:  1.  Endstage right glenohumeral arthritis  2.  Annual follow up left shoulder replacement with symptomatic AC synovitis    Plan: We discussed options for his right shoulder in detail.  He is considering the replacement but he wants to put it off for a bit longer.  I assured him there is no hurry.  He wanted to try another injection today.  The risk, benefits and alternatives were discussed.  He consented and the procedure was performed as described below.  Okay to follow-up as needed for the right shoulder.    His left shoulder exam is consistent with symptomatic acromioclavicular  arthritis.  It looks like he has previously undergone a distal clavicle excision but he clearly seems to be inflamed in that area.  I think an injection might be helpful from both a diagnostic and therapeutic standpoint.  He was not interested in that option today.  I told him to call me if he changes his mind.

## (undated) DEVICE — DRSNG SURESITE WNDW 4X4.5

## (undated) DEVICE — MAT FLR ABSORBENT LG 4FT 10 2.5FT

## (undated) DEVICE — SOL IRR NACL 0.9PCT 3000ML

## (undated) DEVICE — SUT SILK 2/0 TIES 18IN A185H

## (undated) DEVICE — PREP SOL POVIDONE/IODINE BT 4OZ

## (undated) DEVICE — ARM SLING: Brand: DEROYAL

## (undated) DEVICE — SOL ISO/ALC 70PCT 4OZ

## (undated) DEVICE — HANDPIECE SET WITH COAXIAL HIGH FLOW TIP AND SUCTION TUBE: Brand: INTERPULSE

## (undated) DEVICE — APPL CHLORAPREP HI/LITE 26ML ORNG

## (undated) DEVICE — SUT VIC 2/0 CT2 27IN J269H

## (undated) DEVICE — DRAPE,SHOULDER,BEACH ULTRAGARD: Brand: MEDLINE

## (undated) DEVICE — TBG PENCL TELESCP MEGADYNE SMOKE EVAC 10FT

## (undated) DEVICE — PK SHLDR OPN 40

## (undated) DEVICE — DRSNG TELFA PAD NONADH STR 1S 3X8IN

## (undated) DEVICE — 3M™ IOBAN™ 2 ANTIMICROBIAL INCISE DRAPE 6640EZ: Brand: IOBAN™ 2

## (undated) DEVICE — DRAPE,U/ SHT,SPLIT,PLAS,STERIL: Brand: MEDLINE

## (undated) DEVICE — STRIP,CLOSURE,WOUND,MEDI-STRIP,1/2X4: Brand: MEDLINE

## (undated) DEVICE — TRAP FLD MINIVAC MEGADYNE 100ML

## (undated) DEVICE — DUAL CUT SAGITTAL BLADE

## (undated) DEVICE — BIT DRL SCRW PERIPH 2.7MM

## (undated) DEVICE — GLV SURG BIOGEL LTX PF 8 1/2

## (undated) DEVICE — SKIN PREP TRAY W/CHG: Brand: MEDLINE INDUSTRIES, INC.

## (undated) DEVICE — GLV SURG SIGNATURE ESSENTIAL PF LTX SZ8.5